# Patient Record
Sex: FEMALE | Race: BLACK OR AFRICAN AMERICAN | NOT HISPANIC OR LATINO | Employment: FULL TIME | URBAN - METROPOLITAN AREA
[De-identification: names, ages, dates, MRNs, and addresses within clinical notes are randomized per-mention and may not be internally consistent; named-entity substitution may affect disease eponyms.]

---

## 2018-04-07 ENCOUNTER — HOSPITAL ENCOUNTER (EMERGENCY)
Facility: HOSPITAL | Age: 28
Discharge: PSYCHIATRIC OR SUBSTANCE USE FACILITY | End: 2018-04-07
Attending: EMERGENCY MEDICINE
Payer: MEDICAID

## 2018-04-07 VITALS
HEIGHT: 64 IN | OXYGEN SATURATION: 100 % | HEART RATE: 97 BPM | DIASTOLIC BLOOD PRESSURE: 75 MMHG | SYSTOLIC BLOOD PRESSURE: 145 MMHG | RESPIRATION RATE: 18 BRPM | BODY MASS INDEX: 41.48 KG/M2 | WEIGHT: 243 LBS | TEMPERATURE: 99 F

## 2018-04-07 DIAGNOSIS — D64.9 ANEMIA, UNSPECIFIED TYPE: ICD-10-CM

## 2018-04-07 DIAGNOSIS — R45.851 SUICIDAL IDEATION: Primary | ICD-10-CM

## 2018-04-07 LAB
ALBUMIN SERPL BCP-MCNC: 3.8 G/DL
ALP SERPL-CCNC: 111 U/L
ALT SERPL W/O P-5'-P-CCNC: 15 U/L
AMPHET+METHAMPHET UR QL: NEGATIVE
ANION GAP SERPL CALC-SCNC: 11 MMOL/L
ANISOCYTOSIS BLD QL SMEAR: ABNORMAL
APAP SERPL-MCNC: <3 UG/ML
AST SERPL-CCNC: 15 U/L
BACTERIA #/AREA URNS HPF: ABNORMAL /HPF
BARBITURATES UR QL SCN>200 NG/ML: NEGATIVE
BASOPHILS # BLD AUTO: 0.02 K/UL
BASOPHILS NFR BLD: 0.3 %
BENZODIAZ UR QL SCN>200 NG/ML: NEGATIVE
BILIRUB SERPL-MCNC: 0.7 MG/DL
BILIRUB UR QL STRIP: NEGATIVE
BUN SERPL-MCNC: 9 MG/DL
BZE UR QL SCN: NEGATIVE
CALCIUM SERPL-MCNC: 9.2 MG/DL
CANNABINOIDS UR QL SCN: NEGATIVE
CHLORIDE SERPL-SCNC: 105 MMOL/L
CLARITY UR: CLEAR
CO2 SERPL-SCNC: 18 MMOL/L
COLOR UR: YELLOW
CREAT SERPL-MCNC: 0.8 MG/DL
CREAT UR-MCNC: 76.4 MG/DL
DACRYOCYTES BLD QL SMEAR: ABNORMAL
DIFFERENTIAL METHOD: ABNORMAL
EOSINOPHIL # BLD AUTO: 0 K/UL
EOSINOPHIL NFR BLD: 0.3 %
ERYTHROCYTE [DISTWIDTH] IN BLOOD BY AUTOMATED COUNT: 19.8 %
EST. GFR  (AFRICAN AMERICAN): >60 ML/MIN/1.73 M^2
EST. GFR  (NON AFRICAN AMERICAN): >60 ML/MIN/1.73 M^2
ETHANOL SERPL-MCNC: <10 MG/DL
GLUCOSE SERPL-MCNC: 304 MG/DL
GLUCOSE UR QL STRIP: ABNORMAL
HCT VFR BLD AUTO: 30.6 %
HGB BLD-MCNC: 8.6 G/DL
HGB UR QL STRIP: ABNORMAL
HYALINE CASTS #/AREA URNS LPF: 0 /LPF
HYPOCHROMIA BLD QL SMEAR: ABNORMAL
KETONES UR QL STRIP: ABNORMAL
LEUKOCYTE ESTERASE UR QL STRIP: NEGATIVE
LYMPHOCYTES # BLD AUTO: 1.2 K/UL
LYMPHOCYTES NFR BLD: 17.3 %
MCH RBC QN AUTO: 18.7 PG
MCHC RBC AUTO-ENTMCNC: 28.1 G/DL
MCV RBC AUTO: 66 FL
METHADONE UR QL SCN>300 NG/ML: NEGATIVE
MICROSCOPIC COMMENT: ABNORMAL
MONOCYTES # BLD AUTO: 0.4 K/UL
MONOCYTES NFR BLD: 5.1 %
NEUTROPHILS # BLD AUTO: 5.3 K/UL
NEUTROPHILS NFR BLD: 77.3 %
NITRITE UR QL STRIP: NEGATIVE
OPIATES UR QL SCN: NEGATIVE
PCP UR QL SCN>25 NG/ML: NEGATIVE
PH UR STRIP: 6 [PH] (ref 5–8)
PLATELET # BLD AUTO: 614 K/UL
PLATELET BLD QL SMEAR: ABNORMAL
PMV BLD AUTO: 8.9 FL
POCT GLUCOSE: 231 MG/DL (ref 70–110)
POCT GLUCOSE: 357 MG/DL (ref 70–110)
POCT GLUCOSE: 367 MG/DL (ref 70–110)
POIKILOCYTOSIS BLD QL SMEAR: SLIGHT
POLYCHROMASIA BLD QL SMEAR: ABNORMAL
POTASSIUM SERPL-SCNC: 4.4 MMOL/L
PROT SERPL-MCNC: 8.1 G/DL
PROT UR QL STRIP: ABNORMAL
RBC # BLD AUTO: 4.61 M/UL
RBC #/AREA URNS HPF: 1 /HPF (ref 0–4)
SALICYLATES SERPL-MCNC: <5 MG/DL
SODIUM SERPL-SCNC: 134 MMOL/L
SP GR UR STRIP: 1.02 (ref 1–1.03)
SQUAMOUS #/AREA URNS HPF: 20 /HPF
STOMATOCYTES BLD QL SMEAR: PRESENT
TOXICOLOGY INFORMATION: NORMAL
TSH SERPL DL<=0.005 MIU/L-ACNC: 0.45 UIU/ML
URN SPEC COLLECT METH UR: ABNORMAL
UROBILINOGEN UR STRIP-ACNC: NEGATIVE EU/DL
WBC # BLD AUTO: 6.83 K/UL
WBC #/AREA URNS HPF: 1 /HPF (ref 0–5)
YEAST URNS QL MICRO: ABNORMAL

## 2018-04-07 PROCEDURE — 80307 DRUG TEST PRSMV CHEM ANLYZR: CPT

## 2018-04-07 PROCEDURE — 25000003 PHARM REV CODE 250: Performed by: EMERGENCY MEDICINE

## 2018-04-07 PROCEDURE — 96374 THER/PROPH/DIAG INJ IV PUSH: CPT

## 2018-04-07 PROCEDURE — 99282 EMERGENCY DEPT VISIT SF MDM: CPT | Mod: GT,AF,HB, | Performed by: PSYCHIATRY & NEUROLOGY

## 2018-04-07 PROCEDURE — 80053 COMPREHEN METABOLIC PANEL: CPT

## 2018-04-07 PROCEDURE — 80320 DRUG SCREEN QUANTALCOHOLS: CPT

## 2018-04-07 PROCEDURE — 80329 ANALGESICS NON-OPIOID 1 OR 2: CPT

## 2018-04-07 PROCEDURE — 63600175 PHARM REV CODE 636 W HCPCS: Performed by: EMERGENCY MEDICINE

## 2018-04-07 PROCEDURE — 85025 COMPLETE CBC W/AUTO DIFF WBC: CPT

## 2018-04-07 PROCEDURE — 96372 THER/PROPH/DIAG INJ SC/IM: CPT | Mod: 59

## 2018-04-07 PROCEDURE — 82962 GLUCOSE BLOOD TEST: CPT

## 2018-04-07 PROCEDURE — 84443 ASSAY THYROID STIM HORMONE: CPT

## 2018-04-07 PROCEDURE — 99285 EMERGENCY DEPT VISIT HI MDM: CPT | Mod: 25

## 2018-04-07 PROCEDURE — 81000 URINALYSIS NONAUTO W/SCOPE: CPT | Mod: 59

## 2018-04-07 PROCEDURE — 96361 HYDRATE IV INFUSION ADD-ON: CPT

## 2018-04-07 RX ORDER — VENLAFAXINE HYDROCHLORIDE 150 MG/1
75 CAPSULE, EXTENDED RELEASE ORAL DAILY
COMMUNITY

## 2018-04-07 RX ORDER — ACETAMINOPHEN 325 MG/1
650 TABLET ORAL
Status: COMPLETED | OUTPATIENT
Start: 2018-04-07 | End: 2018-04-07

## 2018-04-07 RX ORDER — FERROUS SULFATE 324(65)MG
325 TABLET, DELAYED RELEASE (ENTERIC COATED) ORAL DAILY
COMMUNITY

## 2018-04-07 RX ORDER — METFORMIN HYDROCHLORIDE 1000 MG/1
1000 TABLET ORAL 2 TIMES DAILY WITH MEALS
COMMUNITY
End: 2018-07-12

## 2018-04-07 RX ORDER — ALPRAZOLAM 1 MG/1
1 TABLET ORAL 2 TIMES DAILY
COMMUNITY

## 2018-04-07 RX ORDER — ERGOCALCIFEROL 1.25 MG/1
50000 CAPSULE ORAL
COMMUNITY

## 2018-04-07 RX ADMIN — ACETAMINOPHEN 650 MG: 325 TABLET ORAL at 12:04

## 2018-04-07 RX ADMIN — SODIUM CHLORIDE 1000 ML: 0.9 INJECTION, SOLUTION INTRAVENOUS at 06:04

## 2018-04-07 RX ADMIN — INSULIN HUMAN 10 UNITS: 100 INJECTION, SOLUTION PARENTERAL at 06:04

## 2018-04-07 RX ADMIN — INSULIN HUMAN 10 UNITS: 100 INJECTION, SOLUTION PARENTERAL at 05:04

## 2018-04-07 NOTE — ED NOTES
Pending Glucose Control. Accepting facility has notified RN.    Pt has been accepted to [facility] via [facility]. Accepting MD is Dr. Lance. Call report to [phone].

## 2018-04-07 NOTE — ED NOTES
Faxed packet to [facility], [facility], [facility], [facility], [facility], [facility], [facility], [facility], [facility], [facility], [facility], [facility], [facility], [facility], [facility], [facility],.
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
## 2018-07-12 ENCOUNTER — HOSPITAL ENCOUNTER (INPATIENT)
Facility: HOSPITAL | Age: 28
LOS: 5 days | Discharge: HOME | DRG: 872 | End: 2018-07-17
Attending: EMERGENCY MEDICINE | Admitting: INTERNAL MEDICINE
Payer: MEDICAID

## 2018-07-12 DIAGNOSIS — R00.0 TACHYCARDIA: ICD-10-CM

## 2018-07-12 DIAGNOSIS — N12 PYELONEPHRITIS: ICD-10-CM

## 2018-07-12 DIAGNOSIS — N39.0 URINARY TRACT INFECTION WITHOUT HEMATURIA, SITE UNSPECIFIED: ICD-10-CM

## 2018-07-12 DIAGNOSIS — D64.9 ANEMIA, UNSPECIFIED TYPE: ICD-10-CM

## 2018-07-12 DIAGNOSIS — R11.2 NON-INTRACTABLE VOMITING WITH NAUSEA, UNSPECIFIED VOMITING TYPE: Primary | ICD-10-CM

## 2018-07-12 PROBLEM — E11.65 TYPE 2 DIABETES MELLITUS WITH HYPERGLYCEMIA, WITHOUT LONG-TERM CURRENT USE OF INSULIN: Status: ACTIVE | Noted: 2018-07-12

## 2018-07-12 PROBLEM — A41.9 SEVERE SEPSIS: Status: ACTIVE | Noted: 2018-07-12

## 2018-07-12 PROBLEM — F32.9 MAJOR DEPRESSIVE DISORDER: Status: ACTIVE | Noted: 2018-07-12

## 2018-07-12 PROBLEM — R65.20 SEVERE SEPSIS: Status: ACTIVE | Noted: 2018-07-12

## 2018-07-12 PROBLEM — D62 ACUTE BLOOD LOSS ANEMIA: Status: ACTIVE | Noted: 2018-07-12

## 2018-07-12 LAB
ABO + RH BLD: NORMAL
ALBUMIN SERPL BCP-MCNC: 3 G/DL
ALP SERPL-CCNC: 98 U/L
ALT SERPL W/O P-5'-P-CCNC: 18 U/L
ANION GAP SERPL CALC-SCNC: 12 MMOL/L
ANISOCYTOSIS BLD QL SMEAR: SLIGHT
AST SERPL-CCNC: 30 U/L
B-HCG UR QL: NEGATIVE
B-OH-BUTYR BLD STRIP-SCNC: 0.8 MMOL/L
BACTERIA #/AREA URNS HPF: ABNORMAL /HPF
BASOPHILS # BLD AUTO: 0.01 K/UL
BASOPHILS NFR BLD: 0.1 %
BILIRUB SERPL-MCNC: 1.4 MG/DL
BILIRUB UR QL STRIP: NEGATIVE
BLD GP AB SCN CELLS X3 SERPL QL: NORMAL
BLD PROD TYP BPU: NORMAL
BLD PROD TYP BPU: NORMAL
BLOOD UNIT EXPIRATION DATE: NORMAL
BLOOD UNIT EXPIRATION DATE: NORMAL
BLOOD UNIT TYPE CODE: 6200
BLOOD UNIT TYPE CODE: 6200
BLOOD UNIT TYPE: NORMAL
BLOOD UNIT TYPE: NORMAL
BUN SERPL-MCNC: 10 MG/DL
CALCIUM SERPL-MCNC: 9 MG/DL
CHLORIDE SERPL-SCNC: 104 MMOL/L
CLARITY UR: CLEAR
CO2 SERPL-SCNC: 19 MMOL/L
CODING SYSTEM: NORMAL
CODING SYSTEM: NORMAL
COLOR UR: YELLOW
CREAT SERPL-MCNC: 0.9 MG/DL
DACRYOCYTES BLD QL SMEAR: ABNORMAL
DIFFERENTIAL METHOD: ABNORMAL
DISPENSE STATUS: NORMAL
DISPENSE STATUS: NORMAL
EOSINOPHIL # BLD AUTO: 0 K/UL
EOSINOPHIL NFR BLD: 0.2 %
ERYTHROCYTE [DISTWIDTH] IN BLOOD BY AUTOMATED COUNT: 18.4 %
EST. GFR  (AFRICAN AMERICAN): >60 ML/MIN/1.73 M^2
EST. GFR  (NON AFRICAN AMERICAN): >60 ML/MIN/1.73 M^2
GLUCOSE SERPL-MCNC: 273 MG/DL
GLUCOSE SERPL-MCNC: 279 MG/DL (ref 70–110)
GLUCOSE SERPL-MCNC: 287 MG/DL (ref 70–110)
GLUCOSE SERPL-MCNC: 348 MG/DL (ref 70–110)
GLUCOSE UR QL STRIP: ABNORMAL
HCT VFR BLD AUTO: 20.7 %
HGB BLD-MCNC: 6.1 G/DL
HGB UR QL STRIP: ABNORMAL
HYALINE CASTS #/AREA URNS LPF: 0 /LPF
HYPOCHROMIA BLD QL SMEAR: ABNORMAL
KETONES UR QL STRIP: ABNORMAL
LACTATE SERPL-SCNC: 1.2 MMOL/L
LEUKOCYTE ESTERASE UR QL STRIP: ABNORMAL
LIPASE SERPL-CCNC: 8 U/L
LYMPHOCYTES # BLD AUTO: 0.5 K/UL
LYMPHOCYTES NFR BLD: 5.1 %
MAGNESIUM SERPL-MCNC: 1.2 MG/DL
MCH RBC QN AUTO: 18 PG
MCHC RBC AUTO-ENTMCNC: 29.5 G/DL
MCV RBC AUTO: 61 FL
MICROSCOPIC COMMENT: ABNORMAL
MONOCYTES # BLD AUTO: 0.7 K/UL
MONOCYTES NFR BLD: 8.2 %
NEUTROPHILS # BLD AUTO: 7.8 K/UL
NEUTROPHILS NFR BLD: 86.8 %
NITRITE UR QL STRIP: POSITIVE
NUM UNITS TRANS PACKED RBC: NORMAL
NUM UNITS TRANS PACKED RBC: NORMAL
OVALOCYTES BLD QL SMEAR: ABNORMAL
PH UR STRIP: 6 [PH] (ref 5–8)
PLATELET # BLD AUTO: 268 K/UL
PLATELET BLD QL SMEAR: ABNORMAL
PMV BLD AUTO: 8.7 FL
POCT GLUCOSE: 279 MG/DL (ref 70–110)
POCT GLUCOSE: 287 MG/DL (ref 70–110)
POCT GLUCOSE: 346 MG/DL (ref 70–110)
POCT GLUCOSE: 348 MG/DL (ref 70–110)
POIKILOCYTOSIS BLD QL SMEAR: SLIGHT
POLYCHROMASIA BLD QL SMEAR: ABNORMAL
POTASSIUM SERPL-SCNC: 3.4 MMOL/L
PROCALCITONIN SERPL IA-MCNC: 22.62 NG/ML
PROT SERPL-MCNC: 7.2 G/DL
PROT UR QL STRIP: ABNORMAL
RBC # BLD AUTO: 3.38 M/UL
RBC #/AREA URNS HPF: 7 /HPF (ref 0–4)
SODIUM SERPL-SCNC: 135 MMOL/L
SP GR UR STRIP: 1.02 (ref 1–1.03)
SPHEROCYTES BLD QL SMEAR: ABNORMAL
SQUAMOUS #/AREA URNS HPF: 4 /HPF
STOMATOCYTES BLD QL SMEAR: PRESENT
TARGETS BLD QL SMEAR: ABNORMAL
URN SPEC COLLECT METH UR: ABNORMAL
UROBILINOGEN UR STRIP-ACNC: NEGATIVE EU/DL
WBC # BLD AUTO: 9.02 K/UL
WBC #/AREA URNS HPF: >100 /HPF (ref 0–5)
WBC CLUMPS URNS QL MICRO: ABNORMAL

## 2018-07-12 PROCEDURE — 84145 PROCALCITONIN (PCT): CPT

## 2018-07-12 PROCEDURE — 86850 RBC ANTIBODY SCREEN: CPT

## 2018-07-12 PROCEDURE — 87086 URINE CULTURE/COLONY COUNT: CPT

## 2018-07-12 PROCEDURE — 36430 TRANSFUSION BLD/BLD COMPNT: CPT

## 2018-07-12 PROCEDURE — 87186 SC STD MICRODIL/AGAR DIL: CPT

## 2018-07-12 PROCEDURE — 85025 COMPLETE CBC W/AUTO DIFF WBC: CPT

## 2018-07-12 PROCEDURE — 36415 COLL VENOUS BLD VENIPUNCTURE: CPT

## 2018-07-12 PROCEDURE — 82010 KETONE BODYS QUAN: CPT

## 2018-07-12 PROCEDURE — 63600175 PHARM REV CODE 636 W HCPCS: Performed by: NURSE PRACTITIONER

## 2018-07-12 PROCEDURE — 11000001 HC ACUTE MED/SURG PRIVATE ROOM

## 2018-07-12 PROCEDURE — 81025 URINE PREGNANCY TEST: CPT

## 2018-07-12 PROCEDURE — 93010 ELECTROCARDIOGRAM REPORT: CPT | Mod: ,,, | Performed by: INTERNAL MEDICINE

## 2018-07-12 PROCEDURE — 93005 ELECTROCARDIOGRAM TRACING: CPT

## 2018-07-12 PROCEDURE — 80053 COMPREHEN METABOLIC PANEL: CPT

## 2018-07-12 PROCEDURE — 83605 ASSAY OF LACTIC ACID: CPT

## 2018-07-12 PROCEDURE — 96365 THER/PROPH/DIAG IV INF INIT: CPT

## 2018-07-12 PROCEDURE — 63600175 PHARM REV CODE 636 W HCPCS: Performed by: INTERNAL MEDICINE

## 2018-07-12 PROCEDURE — 96366 THER/PROPH/DIAG IV INF ADDON: CPT

## 2018-07-12 PROCEDURE — 93010 ELECTROCARDIOGRAM REPORT: CPT | Mod: 76,,, | Performed by: INTERNAL MEDICINE

## 2018-07-12 PROCEDURE — 99285 EMERGENCY DEPT VISIT HI MDM: CPT

## 2018-07-12 PROCEDURE — 25000003 PHARM REV CODE 250: Performed by: NURSE PRACTITIONER

## 2018-07-12 PROCEDURE — 87040 BLOOD CULTURE FOR BACTERIA: CPT | Mod: 59

## 2018-07-12 PROCEDURE — 82962 GLUCOSE BLOOD TEST: CPT

## 2018-07-12 PROCEDURE — 87077 CULTURE AEROBIC IDENTIFY: CPT | Mod: 59

## 2018-07-12 PROCEDURE — 25000003 PHARM REV CODE 250: Performed by: INTERNAL MEDICINE

## 2018-07-12 PROCEDURE — 83735 ASSAY OF MAGNESIUM: CPT

## 2018-07-12 PROCEDURE — 96372 THER/PROPH/DIAG INJ SC/IM: CPT

## 2018-07-12 PROCEDURE — 81000 URINALYSIS NONAUTO W/SCOPE: CPT

## 2018-07-12 PROCEDURE — P9016 RBC LEUKOCYTES REDUCED: HCPCS

## 2018-07-12 PROCEDURE — 87088 URINE BACTERIA CULTURE: CPT

## 2018-07-12 PROCEDURE — 96375 TX/PRO/DX INJ NEW DRUG ADDON: CPT

## 2018-07-12 PROCEDURE — 96361 HYDRATE IV INFUSION ADD-ON: CPT

## 2018-07-12 PROCEDURE — 96367 TX/PROPH/DG ADDL SEQ IV INF: CPT

## 2018-07-12 PROCEDURE — 25000003 PHARM REV CODE 250: Performed by: EMERGENCY MEDICINE

## 2018-07-12 PROCEDURE — 83690 ASSAY OF LIPASE: CPT

## 2018-07-12 PROCEDURE — 63600175 PHARM REV CODE 636 W HCPCS: Performed by: EMERGENCY MEDICINE

## 2018-07-12 PROCEDURE — 86920 COMPATIBILITY TEST SPIN: CPT

## 2018-07-12 RX ORDER — ALPRAZOLAM 0.5 MG/1
0.5 TABLET ORAL 3 TIMES DAILY PRN
Status: DISCONTINUED | OUTPATIENT
Start: 2018-07-12 | End: 2018-07-13

## 2018-07-12 RX ORDER — ALPRAZOLAM 0.5 MG/1
0.5 TABLET ORAL 3 TIMES DAILY
Status: DISCONTINUED | OUTPATIENT
Start: 2018-07-12 | End: 2018-07-12

## 2018-07-12 RX ORDER — ONDANSETRON 8 MG/1
8 TABLET, ORALLY DISINTEGRATING ORAL EVERY 8 HOURS PRN
Status: DISCONTINUED | OUTPATIENT
Start: 2018-07-12 | End: 2018-07-14

## 2018-07-12 RX ORDER — LANOLIN ALCOHOL/MO/W.PET/CERES
400 CREAM (GRAM) TOPICAL 2 TIMES DAILY
Status: COMPLETED | OUTPATIENT
Start: 2018-07-12 | End: 2018-07-15

## 2018-07-12 RX ORDER — ACETAMINOPHEN 325 MG/1
650 TABLET ORAL EVERY 4 HOURS PRN
Status: DISCONTINUED | OUTPATIENT
Start: 2018-07-12 | End: 2018-07-17 | Stop reason: HOSPADM

## 2018-07-12 RX ORDER — VENLAFAXINE HYDROCHLORIDE 75 MG/1
75 CAPSULE, EXTENDED RELEASE ORAL DAILY
Status: DISCONTINUED | OUTPATIENT
Start: 2018-07-12 | End: 2018-07-17 | Stop reason: HOSPADM

## 2018-07-12 RX ORDER — SODIUM CHLORIDE 0.9 % (FLUSH) 0.9 %
5 SYRINGE (ML) INJECTION
Status: DISCONTINUED | OUTPATIENT
Start: 2018-07-12 | End: 2018-07-17 | Stop reason: HOSPADM

## 2018-07-12 RX ORDER — SODIUM CHLORIDE, SODIUM LACTATE, POTASSIUM CHLORIDE, CALCIUM CHLORIDE 600; 310; 30; 20 MG/100ML; MG/100ML; MG/100ML; MG/100ML
INJECTION, SOLUTION INTRAVENOUS CONTINUOUS
Status: DISCONTINUED | OUTPATIENT
Start: 2018-07-12 | End: 2018-07-14

## 2018-07-12 RX ORDER — VENLAFAXINE HYDROCHLORIDE 75 MG/1
75 CAPSULE, EXTENDED RELEASE ORAL DAILY
Status: DISCONTINUED | OUTPATIENT
Start: 2018-07-13 | End: 2018-07-12

## 2018-07-12 RX ORDER — OXYCODONE HYDROCHLORIDE 5 MG/1
5 TABLET ORAL EVERY 6 HOURS PRN
Status: DISCONTINUED | OUTPATIENT
Start: 2018-07-12 | End: 2018-07-14

## 2018-07-12 RX ORDER — ALPRAZOLAM 0.5 MG/1
0.5 TABLET ORAL ONCE
Status: COMPLETED | OUTPATIENT
Start: 2018-07-12 | End: 2018-07-12

## 2018-07-12 RX ORDER — IBUPROFEN 200 MG
16 TABLET ORAL
Status: DISCONTINUED | OUTPATIENT
Start: 2018-07-12 | End: 2018-07-17 | Stop reason: HOSPADM

## 2018-07-12 RX ORDER — POTASSIUM CHLORIDE 750 MG/1
10 TABLET, EXTENDED RELEASE ORAL 3 TIMES DAILY
Status: DISCONTINUED | OUTPATIENT
Start: 2018-07-12 | End: 2018-07-13

## 2018-07-12 RX ORDER — DIPHENHYDRAMINE HCL 25 MG
25 CAPSULE ORAL
Status: COMPLETED | OUTPATIENT
Start: 2018-07-12 | End: 2018-07-12

## 2018-07-12 RX ORDER — GLUCAGON 1 MG
1 KIT INJECTION
Status: DISCONTINUED | OUTPATIENT
Start: 2018-07-12 | End: 2018-07-17 | Stop reason: HOSPADM

## 2018-07-12 RX ORDER — ONDANSETRON 2 MG/ML
4 INJECTION INTRAMUSCULAR; INTRAVENOUS
Status: COMPLETED | OUTPATIENT
Start: 2018-07-12 | End: 2018-07-12

## 2018-07-12 RX ORDER — INSULIN ASPART 100 [IU]/ML
0-5 INJECTION, SOLUTION INTRAVENOUS; SUBCUTANEOUS
Status: DISCONTINUED | OUTPATIENT
Start: 2018-07-12 | End: 2018-07-16

## 2018-07-12 RX ORDER — ACETAMINOPHEN 10 MG/ML
500 INJECTION, SOLUTION INTRAVENOUS ONCE
Status: COMPLETED | OUTPATIENT
Start: 2018-07-12 | End: 2018-07-12

## 2018-07-12 RX ORDER — KETOROLAC TROMETHAMINE 30 MG/ML
15 INJECTION, SOLUTION INTRAMUSCULAR; INTRAVENOUS ONCE
Status: COMPLETED | OUTPATIENT
Start: 2018-07-12 | End: 2018-07-12

## 2018-07-12 RX ORDER — IBUPROFEN 200 MG
24 TABLET ORAL
Status: DISCONTINUED | OUTPATIENT
Start: 2018-07-12 | End: 2018-07-17 | Stop reason: HOSPADM

## 2018-07-12 RX ORDER — ACETAMINOPHEN 325 MG/1
650 TABLET ORAL EVERY 6 HOURS PRN
Status: DISCONTINUED | OUTPATIENT
Start: 2018-07-12 | End: 2018-07-12

## 2018-07-12 RX ORDER — HYDROCODONE BITARTRATE AND ACETAMINOPHEN 500; 5 MG/1; MG/1
TABLET ORAL
Status: DISCONTINUED | OUTPATIENT
Start: 2018-07-12 | End: 2018-07-17 | Stop reason: HOSPADM

## 2018-07-12 RX ORDER — VENLAFAXINE HYDROCHLORIDE 75 MG/1
75 CAPSULE, EXTENDED RELEASE ORAL DAILY
Status: DISCONTINUED | OUTPATIENT
Start: 2018-07-12 | End: 2018-07-12

## 2018-07-12 RX ADMIN — ACETAMINOPHEN 650 MG: 325 TABLET, FILM COATED ORAL at 05:07

## 2018-07-12 RX ADMIN — KETOROLAC TROMETHAMINE 15 MG: 30 INJECTION, SOLUTION INTRAMUSCULAR at 08:07

## 2018-07-12 RX ADMIN — OXYCODONE HYDROCHLORIDE 5 MG: 5 TABLET ORAL at 11:07

## 2018-07-12 RX ADMIN — INSULIN ASPART 2 UNITS: 100 INJECTION, SOLUTION INTRAVENOUS; SUBCUTANEOUS at 09:07

## 2018-07-12 RX ADMIN — ACETAMINOPHEN 500 MG: 10 INJECTION, SOLUTION INTRAVENOUS at 10:07

## 2018-07-12 RX ADMIN — SODIUM CHLORIDE, SODIUM LACTATE, POTASSIUM CHLORIDE, AND CALCIUM CHLORIDE: .6; .31; .03; .02 INJECTION, SOLUTION INTRAVENOUS at 11:07

## 2018-07-12 RX ADMIN — CEFTRIAXONE 1 G: 1 INJECTION, SOLUTION INTRAVENOUS at 02:07

## 2018-07-12 RX ADMIN — SODIUM CHLORIDE 1000 ML: 0.9 INJECTION, SOLUTION INTRAVENOUS at 01:07

## 2018-07-12 RX ADMIN — POTASSIUM CHLORIDE 10 MEQ: 750 TABLET, FILM COATED, EXTENDED RELEASE ORAL at 09:07

## 2018-07-12 RX ADMIN — MAGNESIUM OXIDE TAB 400 MG (241.3 MG ELEMENTAL MG) 400 MG: 400 (241.3 MG) TAB at 09:07

## 2018-07-12 RX ADMIN — SODIUM CHLORIDE, SODIUM LACTATE, POTASSIUM CHLORIDE, AND CALCIUM CHLORIDE 1000 ML: .6; .31; .03; .02 INJECTION, SOLUTION INTRAVENOUS at 06:07

## 2018-07-12 RX ADMIN — SODIUM CHLORIDE 1000 ML: 0.9 INJECTION, SOLUTION INTRAVENOUS at 03:07

## 2018-07-12 RX ADMIN — INSULIN DETEMIR 20 UNITS: 100 INJECTION, SOLUTION SUBCUTANEOUS at 09:07

## 2018-07-12 RX ADMIN — ACETAMINOPHEN 500 MG: 10 INJECTION, SOLUTION INTRAVENOUS at 08:07

## 2018-07-12 RX ADMIN — ONDANSETRON 4 MG: 2 INJECTION, SOLUTION INTRAMUSCULAR; INTRAVENOUS at 01:07

## 2018-07-12 RX ADMIN — PIPERACILLIN AND TAZOBACTAM 4.5 G: 4; .5 INJECTION, POWDER, LYOPHILIZED, FOR SOLUTION INTRAVENOUS; PARENTERAL at 11:07

## 2018-07-12 RX ADMIN — ALPRAZOLAM 0.5 MG: 0.5 TABLET ORAL at 06:07

## 2018-07-12 RX ADMIN — DIPHENHYDRAMINE HYDROCHLORIDE 25 MG: 25 CAPSULE ORAL at 07:07

## 2018-07-12 RX ADMIN — INSULIN HUMAN 6 UNITS: 100 INJECTION, SOLUTION PARENTERAL at 04:07

## 2018-07-12 NOTE — ED NOTES
"Pt c/o mid back pain rated 10/10 on pain scale. Describes as "hard pressure"    Notified WINSTON Purvis NP  "

## 2018-07-12 NOTE — HPI
Started feeling bad this Tuesday.  The first symptom she noted was her hands felt warm early in the day.  Later on she had fevers and chills and mid back pain.  Associated with weakness, dizziness, and shortness of breath walking around the house.  Later she developed nausea and vomiting.  She vomited a clear yellow liquid that tasted bitter.  Yesterday she went to an after-hours clinic and was given Tylenol and Ibuprofen for symptoms and sent home.  She continued to feel worse so came to the ER today.  Denies any recent blood loss and her menstrual cycles have been regular with normal volume. No other recent illness or known sick contacts.

## 2018-07-12 NOTE — ED NOTES
Pts HR noted in 140s on monitor. Pts Temp 100.5. Pt reports SOB but denies any chest pain. HOB raised. Paged [facility] medicine

## 2018-07-12 NOTE — ED PROVIDER NOTES
SCRIBE #1 NOTE: I, Corinne Mack, am scribing for, and in the presence of, Lito Bowman DO. I have scribed the entire note.      History      Chief Complaint   Patient presents with    Emesis     vomiting and fever       Review of patient's allergies indicates:  No Known Allergies     HPI   HPI    2018, 12:27 PM   History obtained from the patient      History of Present Illness: Ladi Gabriel is a 28 y.o. female patient with PMHx of DM who presents to the Emergency Department for fever (Tmax 102.8) which onset gradually 2 days ago. Pt went to [facility] yesterday and was Dx with a viral syndrome. Symptoms are intermittent and moderate in severity. No mitigating or exacerbating factors reported. Associated sxs include urinary frequency, N/V, and chills. Pt states she may have a UTI. Patient denies any diarrhea, abd pain, back pain, neck pain, dysuria, hematuria, HA, and all other sxs at this time. No prior Tx reported. No further complaints or concerns at this time.         Arrival mode: AASI    PCP: Provider Not[facility]ystem       Past Medical History:  Past Medical History:   Diagnosis Date    Depression     Diabetes     Uterine fibroids in pregnancy, postpartum condition     Vitamin D deficiency        Past Surgical History:  Past Surgical History:   Procedure Laterality Date     SECTION           Family History:  History reviewed. No pertinent family history.    Social History:  Social History     Social History Main Topics    Smoking status: Never Smoker    Smokeless tobacco: Never Used    Alcohol use 3.6 oz/week     6 Glasses of wine per week    Drug use: No    Sexual activity: Not on file       ROS   Review of Systems   Constitutional: Positive for chills and fever (Tmax 102.8).   Respiratory: Negative for cough and shortness of breath.    Cardiovascular: Negative for chest pain and leg swelling.   Gastrointestinal: Positive for nausea and vomiting. Negative for abdominal pain and  diarrhea.   Genitourinary: Positive for frequency. Negative for dysuria, flank pain and hematuria.   Musculoskeletal: Negative for back pain, neck pain and neck stiffness.   Skin: Negative for rash and wound.   Neurological: Negative for dizziness, light-headedness, numbness and headaches.   All other systems reviewed and are negative.    Physical Exam      Initial Vitals [07/12/18 1221]   BP Pulse Resp Temp SpO2   126/64 (!) 130 20 (!) 102.8 °F (39.3 °C) 99 %      MAP       --          Physical Exam  Nursing Notes and Vital Signs Reviewed.  Constitutional: Patient is in no apparent distress. Well-developed and well-nourished.  Head: Atraumatic. Normocephalic.  Eyes: PERRL. EOM intact. Conjunctivae are not pale. No scleral icterus.  ENT: Mucous membranes are moist. Oropharynx is clear and symmetric.    Neck: Supple. Full ROM. No lymphadenopathy.  Cardiovascular: Tachycardic. Regular rhythm. No murmurs, rubs, or gallops. Distal pulses are 2+ and symmetric.  Pulmonary/Chest: No respiratory distress. Clear to auscultation bilaterally. No wheezing or rales.  Abdominal: Soft and non-distended.  There is no tenderness.  No rebound, guarding, or rigidity.  Musculoskeletal: Moves all extremities. No obvious deformities.  Skin: Warm and dry.  Neurological:  Alert, awake, and appropriate.  Normal speech.  No acute focal neurological deficits are appreciated.  Psychiatric: Normal affect. Good eye contact. Appropriate in content.    ED Course    Procedures  ED Vital Signs:  Vitals:    07/12/18 1221 07/12/18 1306 07/12/18 1307 07/12/18 1335   BP: 126/64  (!) 111/56    Pulse: (!) 130  (!) 121 (!) 120   Resp: 20  20    Temp: (!) 102.8 °F (39.3 °C) (!) 100.6 °F (38.1 °C)     TempSrc: Oral Oral     SpO2: 99%  100%    Weight:  114.9 kg (253 lb 6.4 oz)      07/12/18 1406 07/12/18 1432 07/12/18 1546   BP: 111/61  (!) 104/56   Pulse: (!) 126 110 109   Resp: 14 (!) 22 20   Temp:  99.6 °F (37.6 °C) 99.4 °F (37.4 °C)   TempSrc:  Oral Oral    SpO2: 99% 100% 98%   Weight:          Abnormal Lab Results:  Labs Reviewed   CBC W/ AUTO DIFFERENTIAL - Abnormal; Notable for the following:        Result Value    RBC 3.38 (*)     Hemoglobin 6.1 (*)     Hematocrit 20.7 (*)     MCV 61 (*)     MCH 18.0 (*)     MCHC 29.5 (*)     RDW 18.4 (*)     MPV 8.7 (*)     Gran # (ANC) 7.8 (*)     Lymph # 0.5 (*)     Gran% 86.8 (*)     Lymph% 5.1 (*)     All other components within normal limits   COMPREHENSIVE METABOLIC PANEL - Abnormal; Notable for the following:     Sodium 135 (*)     Potassium 3.4 (*)     CO2 19 (*)     Glucose 273 (*)     Albumin 3.0 (*)     Total Bilirubin 1.4 (*)     All other components within normal limits   URINALYSIS - Abnormal; Notable for the following:     Protein, UA 2+ (*)     Glucose, UA 2+ (*)     Ketones, UA 3+ (*)     Occult Blood UA 2+ (*)     Nitrite, UA Positive (*)     Leukocytes, UA 1+ (*)     All other components within normal limits   BETA - HYDROXYBUTYRATE, SERUM - Abnormal; Notable for the following:     Beta-Hydroxybutyrate 0.8 (*)     All other components within normal limits   URINALYSIS MICROSCOPIC - Abnormal; Notable for the following:     RBC, UA 7 (*)     WBC, UA >100 (*)     Bacteria, UA Moderate (*)     All other components within normal limits   POCT GLUCOSE MONITORING CONTINUOUS - Abnormal; Notable for the following:     POC Glucose 287 (*)     All other components within normal limits   POCT GLUCOSE - Abnormal; Notable for the following:     POCT Glucose 287 (*)     All other components within normal limits   POCT GLUCOSE - Abnormal; Notable for the following:     POCT Glucose 279 (*)     All other components within normal limits   CULTURE, BLOOD   CULTURE, BLOOD   LIPASE   PREGNANCY TEST, URINE RAPID   LACTIC ACID, PLASMA   LACTIC ACID, PLASMA   TYPE & SCREEN   POCT GLUCOSE MONITORING CONTINUOUS   PREPARE RBC SOFT        All Lab Results:  Results for orders placed or performed during the hospital encounter of 07/12/18    CBC auto differential   Result Value Ref Range    WBC 9.02 3.90 - 12.70 K/uL    RBC 3.38 (L) 4.00 - 5.40 M/uL    Hemoglobin 6.1 (L) 12.0 - 16.0 g/dL    Hematocrit 20.7 (L) 37.0 - 48.5 %    MCV 61 (L) 82 - 98 fL    MCH 18.0 (L) 27.0 - 31.0 pg    MCHC 29.5 (L) 32.0 - 36.0 g/dL    RDW 18.4 (H) 11.5 - 14.5 %    Platelets 268 150 - 350 K/uL    MPV 8.7 (L) 9.2 - 12.9 fL    Gran # (ANC) 7.8 (H) 1.8 - 7.7 K/uL    Lymph # 0.5 (L) 1.0 - 4.8 K/uL    Mono # 0.7 0.3 - 1.0 K/uL    Eos # 0.0 0.0 - 0.5 K/uL    Baso # 0.01 0.00 - 0.20 K/uL    Gran% 86.8 (H) 38.0 - 73.0 %    Lymph% 5.1 (L) 18.0 - 48.0 %    Mono% 8.2 4.0 - 15.0 %    Eosinophil% 0.2 0.0 - 8.0 %    Basophil% 0.1 0.0 - 1.9 %    Platelet Estimate Appears normal     Aniso Slight     Poik Slight     Poly Occasional     Hypo Marked     Ovalocytes Occasional     Target Cells Moderate     Tear Drop Cells Occasional     Stomatocytes Present     Spherocytes Occasional     Differential Method Automated    Comprehensive metabolic panel   Result Value Ref Range    Sodium 135 (L) 136 - 145 mmol/L    Potassium 3.4 (L) 3.5 - 5.1 mmol/L    Chloride 104 95 - 110 mmol/L    CO2 19 (L) 23 - 29 mmol/L    Glucose 273 (H) 70 - 110 mg/dL    BUN, Bld 10 6 - 20 mg/dL    Creatinine 0.9 0.5 - 1.4 mg/dL    Calcium 9.0 8.7 - 10.5 mg/dL    Total Protein 7.2 6.0 - 8.4 g/dL    Albumin 3.0 (L) 3.5 - 5.2 g/dL    Total Bilirubin 1.4 (H) 0.1 - 1.0 mg/dL    Alkaline Phosphatase 98 55 - 135 U/L    AST 30 10 - 40 U/L    ALT 18 10 - 44 U/L    Anion Gap 12 8 - 16 mmol/L    eGFR if African American >60 >60 mL/min/1.73 m^2    eGFR if non African American >60 >60 mL/min/1.73 m^2   Lipase   Result Value Ref Range    Lipase 8 4 - 60 U/L   Urinalysis   Result Value Ref Range    Specimen UA Urine, Clean Catch     Color, UA Yellow Yellow, Straw, Fe    Appearance, UA Clear Clear    pH, UA 6.0 5.0 - 8.0    Specific Gravity, UA 1.020 1.005 - 1.030    Protein, UA 2+ (A) Negative    Glucose, UA 2+ (A) Negative     Ketones, UA 3+ (A) Negative    Bilirubin (UA) Negative Negative    Occult Blood UA 2+ (A) Negative    Nitrite, UA Positive (A) Negative    Urobilinogen, UA Negative <2.0 EU/dL    Leukocytes, UA 1+ (A) Negative   Pregnancy, urine rapid   Result Value Ref Range    Preg Test, Ur Negative    Beta - Hydroxybutyrate, Serum   Result Value Ref Range    Beta-Hydroxybutyrate 0.8 (H) 0.0 - 0.5 mmol/L   Urinalysis Microscopic   Result Value Ref Range    RBC, UA 7 (H) 0 - 4 /hpf    WBC, UA >100 (H) 0 - 5 /hpf    WBC Clumps, UA Rare None-Rare    Bacteria, UA Moderate (A) None-Occ /hpf    Squam Epithel, UA 4 /hpf    Hyaline Casts, UA 0 0-1/lpf /lpf    Microscopic Comment SEE COMMENT    Lactic acid, plasma   Result Value Ref Range    Lactate (Lactic Acid) 1.2 0.5 - 2.2 mmol/L   POCT glucose   Result Value Ref Range    POC Glucose 287 (A) 70 - 110 MG/DL   POCT glucose   Result Value Ref Range    POCT Glucose 287 (H) 70 - 110 mg/dL   POCT glucose   Result Value Ref Range    POCT Glucose 279 (H) 70 - 110 mg/dL       Imaging Results:  Imaging Results          X-Ray Chest AP Portable (Final result)  Result time 07/12/18 14:17:50    Final result by MALOCLM Moncada Sr., MD (07/12/18 14:17:50)                 Impression:      Normal study.      Electronically signed by: Luc Moncada MD  Date:    07/12/2018  Time:    14:17             Narrative:    EXAMINATION:  XR CHEST AP PORTABLE    CLINICAL HISTORY:  fever;    COMPARISON:  None    FINDINGS:  The size of the heart is normal. The lungs are clear. There is no pneumothorax.  The costophrenic angles are sharp.                                 The EKG was ordered, reviewed, and independently interpreted by the ED provider.  Interpretation time: 1551  Rate: 111 BPM  Rhythm: sinus tachycardia  Interpretation: Nonspecific ST and T wave abnormality. No STEMI.             The Emergency Provider reviewed the vital signs and test results, which are outlined above.    ED Discussion     3:20 PM:  "Labs reviewed and d/w pt. Pt states that in 2014 she received transfusions for anemia due to pt's heavy cycles which are caused by fibroids.    3:22 PM: Re-evaluated pt. I have discussed test results, shared treatment plan, and the need for admission with patient and family at bedside. Pt and family express understanding at this time and agree with all information. All questions answered. Pt and family have no further questions or concerns at this time. Pt is ready for admit.    3:36 PM: Discussed case with Justin Chavarria NP ([facility]) (Hospital Medicine). Dr. Holloway agrees with current care and management of pt and accepts admission.   Admitting Service: Hospital medicine   Admitting Physician: Dr. Holloway  Admit to: Obs Tele        ED Medication(s):  Medications   0.9%  NaCl infusion (for blood administration) (not administered)   insulin regular injection 6 Units (not administered)   sodium chloride 0.9% bolus 1,000 mL (0 mLs Intravenous Stopped 7/12/18 1537)   ondansetron injection 4 mg (4 mg Intravenous Given 7/12/18 1311)   cefTRIAXone (ROCEPHIN) 1 g in dextrose 5 % 50 mL IVPB (0 g Intravenous Stopped 7/12/18 1538)   sodium chloride 0.9% bolus 1,000 mL (1,000 mLs Intravenous New Bag 7/12/18 1540)       New Prescriptions    No medications on file             Medical Decision Making    Medical Decision Making:   Clinical Tests:   Lab Tests: Ordered and Reviewed  Radiological Study: Ordered and Reviewed  Medical Tests: Ordered and Reviewed           Scribe Attestation:   Scribe #1: I performed the above scribed service and the documentation accurately describes the services I performed. I attest to the accuracy of the note.    Attending:   Physician Attestation Statement for Scribe #1: I, Lito Bowman DO, personally performed the services described in this documentation, as scribed by Corinne Mack, in my presence, and it is both accurate and complete.          Clinical Impression       ICD-10-CM ICD-9-CM   1. " Non-intractable vomiting with nausea, unspecified vomiting type R11.2 787.01   2. Urinary tract infection without hematuria, site unspecified N39.0 599.0   3. Anemia, unspecified type D64.9 285.9       Disposition:   Disposition: Placed in Observation  Condition: Willis Bowman DO  07/12/18 160

## 2018-07-12 NOTE — ED NOTES
Pt lying in bed comfortably watching tv. AAO x 4. Skin warm and dry. Breath sounds even and unlabored with equal chest rise and fall. States no needs at this time.

## 2018-07-12 NOTE — ED NOTES
"Pt resting comfortably in bed. AAO x 4. Skin warm and dry. Breath sounds even and unlabored with equal chest rise and fall. Pt states "Im feeling better can I eat?"  Ok to eat per Dr Bowman.   Pt given a sandwich and juice  "

## 2018-07-13 PROBLEM — R78.81 BACTEREMIA DUE TO GRAM-NEGATIVE BACTERIA: Status: ACTIVE | Noted: 2018-07-13

## 2018-07-13 LAB
ALBUMIN SERPL BCP-MCNC: 2.4 G/DL
ALP SERPL-CCNC: 86 U/L
ALT SERPL W/O P-5'-P-CCNC: 18 U/L
ANION GAP SERPL CALC-SCNC: 8 MMOL/L
ANION GAP SERPL CALC-SCNC: 9 MMOL/L
AST SERPL-CCNC: 18 U/L
BASOPHILS # BLD AUTO: 0 K/UL
BASOPHILS NFR BLD: 0 %
BILIRUB DIRECT SERPL-MCNC: 1.5 MG/DL
BILIRUB SERPL-MCNC: 3 MG/DL
BILIRUB SERPL-MCNC: 3.1 MG/DL
BUN SERPL-MCNC: 10 MG/DL
BUN SERPL-MCNC: 5 MG/DL
CALCIUM SERPL-MCNC: 8.5 MG/DL
CALCIUM SERPL-MCNC: 9.1 MG/DL
CHLORIDE SERPL-SCNC: 104 MMOL/L
CHLORIDE SERPL-SCNC: 104 MMOL/L
CO2 SERPL-SCNC: 22 MMOL/L
CO2 SERPL-SCNC: 23 MMOL/L
CREAT SERPL-MCNC: 0.9 MG/DL
CREAT SERPL-MCNC: 1 MG/DL
DIFFERENTIAL METHOD: ABNORMAL
EOSINOPHIL # BLD AUTO: 0 K/UL
EOSINOPHIL NFR BLD: 0.3 %
ERYTHROCYTE [DISTWIDTH] IN BLOOD BY AUTOMATED COUNT: 21.2 %
EST. GFR  (AFRICAN AMERICAN): >60 ML/MIN/1.73 M^2
EST. GFR  (AFRICAN AMERICAN): >60 ML/MIN/1.73 M^2
EST. GFR  (NON AFRICAN AMERICAN): >60 ML/MIN/1.73 M^2
EST. GFR  (NON AFRICAN AMERICAN): >60 ML/MIN/1.73 M^2
GLUCOSE SERPL-MCNC: 246 MG/DL
GLUCOSE SERPL-MCNC: 301 MG/DL
HCT VFR BLD AUTO: 23.5 %
HGB BLD-MCNC: 7.3 G/DL
HGB BLD-MCNC: 7.7 G/DL
INR PPP: 1.1
LDH SERPL L TO P-CCNC: 208 U/L
LYMPHOCYTES # BLD AUTO: 1 K/UL
LYMPHOCYTES NFR BLD: 11.8 %
MAGNESIUM SERPL-MCNC: 1.3 MG/DL
MAGNESIUM SERPL-MCNC: 2 MG/DL
MCH RBC QN AUTO: 20.2 PG
MCHC RBC AUTO-ENTMCNC: 31.1 G/DL
MCV RBC AUTO: 65 FL
MONOCYTES # BLD AUTO: 0.7 K/UL
MONOCYTES NFR BLD: 8.1 %
NEUTROPHILS # BLD AUTO: 7 K/UL
NEUTROPHILS NFR BLD: 80.1 %
PHOSPHATE SERPL-MCNC: 2.9 MG/DL
PLATELET # BLD AUTO: 227 K/UL
PMV BLD AUTO: 9.1 FL
POCT GLUCOSE: 271 MG/DL (ref 70–110)
POCT GLUCOSE: 293 MG/DL (ref 70–110)
POCT GLUCOSE: 297 MG/DL (ref 70–110)
POCT GLUCOSE: 344 MG/DL (ref 70–110)
POTASSIUM SERPL-SCNC: 3 MMOL/L
POTASSIUM SERPL-SCNC: 3.3 MMOL/L
PROT SERPL-MCNC: 6.1 G/DL
PROTHROMBIN TIME: 12 SEC
RBC # BLD AUTO: 3.62 M/UL
SODIUM SERPL-SCNC: 135 MMOL/L
SODIUM SERPL-SCNC: 135 MMOL/L
WBC # BLD AUTO: 8.75 K/UL

## 2018-07-13 PROCEDURE — 25000003 PHARM REV CODE 250: Performed by: EMERGENCY MEDICINE

## 2018-07-13 PROCEDURE — 83036 HEMOGLOBIN GLYCOSYLATED A1C: CPT

## 2018-07-13 PROCEDURE — 11000001 HC ACUTE MED/SURG PRIVATE ROOM

## 2018-07-13 PROCEDURE — 93005 ELECTROCARDIOGRAM TRACING: CPT

## 2018-07-13 PROCEDURE — 85610 PROTHROMBIN TIME: CPT

## 2018-07-13 PROCEDURE — 25000003 PHARM REV CODE 250: Performed by: NURSE PRACTITIONER

## 2018-07-13 PROCEDURE — 63600175 PHARM REV CODE 636 W HCPCS: Performed by: INTERNAL MEDICINE

## 2018-07-13 PROCEDURE — 84100 ASSAY OF PHOSPHORUS: CPT

## 2018-07-13 PROCEDURE — 83735 ASSAY OF MAGNESIUM: CPT

## 2018-07-13 PROCEDURE — 82248 BILIRUBIN DIRECT: CPT

## 2018-07-13 PROCEDURE — 80048 BASIC METABOLIC PNL TOTAL CA: CPT

## 2018-07-13 PROCEDURE — 25000003 PHARM REV CODE 250: Performed by: INTERNAL MEDICINE

## 2018-07-13 PROCEDURE — 85025 COMPLETE CBC W/AUTO DIFF WBC: CPT

## 2018-07-13 PROCEDURE — 93010 ELECTROCARDIOGRAM REPORT: CPT | Mod: ,,, | Performed by: INTERNAL MEDICINE

## 2018-07-13 PROCEDURE — 82247 BILIRUBIN TOTAL: CPT

## 2018-07-13 PROCEDURE — 85018 HEMOGLOBIN: CPT

## 2018-07-13 PROCEDURE — 80053 COMPREHEN METABOLIC PANEL: CPT

## 2018-07-13 PROCEDURE — 36415 COLL VENOUS BLD VENIPUNCTURE: CPT

## 2018-07-13 PROCEDURE — 99900035 HC TECH TIME PER 15 MIN (STAT)

## 2018-07-13 PROCEDURE — 83615 LACTATE (LD) (LDH) ENZYME: CPT

## 2018-07-13 RX ORDER — POTASSIUM CHLORIDE 20 MEQ/1
20 TABLET, EXTENDED RELEASE ORAL 3 TIMES DAILY
Status: DISPENSED | OUTPATIENT
Start: 2018-07-13 | End: 2018-07-15

## 2018-07-13 RX ORDER — IBUPROFEN 400 MG/1
400 TABLET ORAL ONCE
Status: COMPLETED | OUTPATIENT
Start: 2018-07-13 | End: 2018-07-13

## 2018-07-13 RX ORDER — MAGNESIUM SULFATE 1 G/100ML
1 INJECTION INTRAVENOUS
Status: COMPLETED | OUTPATIENT
Start: 2018-07-13 | End: 2018-07-13

## 2018-07-13 RX ORDER — ALPRAZOLAM 1 MG/1
1 TABLET ORAL 3 TIMES DAILY PRN
Status: DISCONTINUED | OUTPATIENT
Start: 2018-07-13 | End: 2018-07-14

## 2018-07-13 RX ORDER — POTASSIUM CHLORIDE 7.45 MG/ML
20 INJECTION INTRAVENOUS ONCE
Status: COMPLETED | OUTPATIENT
Start: 2018-07-13 | End: 2018-07-13

## 2018-07-13 RX ADMIN — ONDANSETRON 8 MG: 8 TABLET, ORALLY DISINTEGRATING ORAL at 04:07

## 2018-07-13 RX ADMIN — PIPERACILLIN AND TAZOBACTAM 4.5 G: 4; .5 INJECTION, POWDER, LYOPHILIZED, FOR SOLUTION INTRAVENOUS; PARENTERAL at 05:07

## 2018-07-13 RX ADMIN — ACETAMINOPHEN 650 MG: 325 TABLET, FILM COATED ORAL at 03:07

## 2018-07-13 RX ADMIN — ACETAMINOPHEN 650 MG: 325 TABLET, FILM COATED ORAL at 11:07

## 2018-07-13 RX ADMIN — ACETAMINOPHEN 650 MG: 325 TABLET, FILM COATED ORAL at 05:07

## 2018-07-13 RX ADMIN — POTASSIUM CHLORIDE 20 MEQ: 1500 TABLET, EXTENDED RELEASE ORAL at 08:07

## 2018-07-13 RX ADMIN — IBUPROFEN 400 MG: 400 TABLET, FILM COATED ORAL at 04:07

## 2018-07-13 RX ADMIN — SODIUM CHLORIDE, SODIUM LACTATE, POTASSIUM CHLORIDE, AND CALCIUM CHLORIDE: .6; .31; .03; .02 INJECTION, SOLUTION INTRAVENOUS at 01:07

## 2018-07-13 RX ADMIN — MAGNESIUM SULFATE IN DEXTROSE 1 G: 10 INJECTION, SOLUTION INTRAVENOUS at 09:07

## 2018-07-13 RX ADMIN — MAGNESIUM OXIDE TAB 400 MG (241.3 MG ELEMENTAL MG) 400 MG: 400 (241.3 MG) TAB at 08:07

## 2018-07-13 RX ADMIN — VENLAFAXINE HYDROCHLORIDE 75 MG: 75 CAPSULE, EXTENDED RELEASE ORAL at 08:07

## 2018-07-13 RX ADMIN — POTASSIUM CHLORIDE 20 MEQ: 1500 TABLET, EXTENDED RELEASE ORAL at 02:07

## 2018-07-13 RX ADMIN — INSULIN DETEMIR 20 UNITS: 100 INJECTION, SOLUTION SUBCUTANEOUS at 08:07

## 2018-07-13 RX ADMIN — SODIUM CHLORIDE, SODIUM LACTATE, POTASSIUM CHLORIDE, AND CALCIUM CHLORIDE: .6; .31; .03; .02 INJECTION, SOLUTION INTRAVENOUS at 08:07

## 2018-07-13 RX ADMIN — ALPRAZOLAM 1 MG: 1 TABLET ORAL at 10:07

## 2018-07-13 RX ADMIN — INSULIN ASPART 3 UNITS: 100 INJECTION, SOLUTION INTRAVENOUS; SUBCUTANEOUS at 06:07

## 2018-07-13 RX ADMIN — MAGNESIUM SULFATE IN DEXTROSE 1 G: 10 INJECTION, SOLUTION INTRAVENOUS at 12:07

## 2018-07-13 RX ADMIN — INSULIN ASPART 1 UNITS: 100 INJECTION, SOLUTION INTRAVENOUS; SUBCUTANEOUS at 08:07

## 2018-07-13 RX ADMIN — ALPRAZOLAM 1 MG: 1 TABLET ORAL at 03:07

## 2018-07-13 RX ADMIN — INSULIN ASPART 3 UNITS: 100 INJECTION, SOLUTION INTRAVENOUS; SUBCUTANEOUS at 05:07

## 2018-07-13 RX ADMIN — MAGNESIUM SULFATE IN DEXTROSE 1 G: 10 INJECTION, SOLUTION INTRAVENOUS at 08:07

## 2018-07-13 RX ADMIN — POTASSIUM CHLORIDE 20 MEQ: 10 INJECTION, SOLUTION INTRAVENOUS at 10:07

## 2018-07-13 RX ADMIN — MAGNESIUM SULFATE IN DEXTROSE 1 G: 10 INJECTION, SOLUTION INTRAVENOUS at 11:07

## 2018-07-13 RX ADMIN — CEFTRIAXONE 1 G: 1 INJECTION, SOLUTION INTRAVENOUS at 02:07

## 2018-07-13 RX ADMIN — INSULIN ASPART 4 UNITS: 100 INJECTION, SOLUTION INTRAVENOUS; SUBCUTANEOUS at 11:07

## 2018-07-13 NOTE — PLAN OF CARE
Problem: Patient Care Overview  Goal: Plan of Care Review  Outcome: Ongoing (interventions implemented as appropriate)  Pt in bed AAOx4. Plan of Care reviewed, Verbalized understanding.  Patient remained free from falls, fall precautions in place.   Pt is ST on monitor.    PIV CDI with LR running at 125ml/hr  Call bell and personal belongings within reach. Hourly rounding complete. Reminded to call for assistance.   No complaints at this time. Will continue to monitor.      Value Min Max   Temp 99.2 °F (37.3 °C) 103.2 °F (39.6 °C)   Pulse 109 147   Resp 14 29   BP: Systolic 94 130   BP: Diastolic 51 76   MAP (mmHg) 74 90   SpO2 97 % 100 %

## 2018-07-13 NOTE — HOSPITAL COURSE
Admitted for evaluation and treatment of symptomatic blood loss anemia and sepsis from right pyelonephritis.  Urinalysis with a pattern of inflammatory factor consistent with urinary tract infection.  Blood and urine cultures drawn.  Empirically started Ceftriaxone.  IV fluid resuscitation with 2 L normal saline followed by 1 L Lactated Ringers.  Ordered complete abdominal ultrasound to evaluate kidneys and gallbladder which showed bilateral pyelonephritis with enlargement or the right kidney.  Liver, gallbladder, and spleen normal.  Changed antibiotic back to Ceftriaxone.  Blood cultures grew Gram-negative rods.    7/14/18 Pt was seen and examined at bedside .  She  Was placed on o2 by nc overnight due to hypoxia . The cxr show pulmonary congestion .  She cont  Speaking fever .   7/15/18. CT abdomen show pylo  And lower lung consolidation .  She has 2 prbc  W/o any problem.   7/16/18 Pt states feel better . She cont speaking fever  7/17/19 Pt has been afebrile for more than 32 hrs  Pt was seen and examined at bedside . She was determined to be suitable for d/c

## 2018-07-13 NOTE — ASSESSMENT & PLAN NOTE
Accuchecks and low dose sliding scale.  Levemir 20 units nightly.  Diabetic diet.  Check hemoglobin A1c.

## 2018-07-13 NOTE — PLAN OF CARE
Dr Holloway made aware that pt is having trouble with cost of test strips. He is to write prescription for test strips prior to d/c home. Pt informed.    CM met with pt and her family at bedside. Pt lives in a one story house with her children. She is independent, denies falls and DME use. Pt stated that her insurance no longer covers test strips and she needs help with covering cost. Pt is employed and drives. Discharge plan, [facility] discussed and transitional folder with pt. CM name and number written on white board.  Payor: Medicaid     07/13/18 6783   Discharge Assessment   Assessment Type Discharge Planning Assessment   Confirmed/corrected address and phone number on facesheet? Yes   Assessment information obtained from? Patient   Expected Length of Stay (days) (Unknown at this time)   Communicated expected length of stay with patient/caregiver yes   Prior to hospitilization cognitive status: Alert/Oriented   Prior to hospitalization functional status: Independent   Current cognitive status: Alert/Oriented   Current Functional Status: Independent   Facility Arrived From: (Home)   Lives With child(shaun), dependent   Able to Return to Prior Arrangements yes   Is patient able to care for self after discharge? Yes   Who are your caregiver(s) and their phone number(s)? (Radha Lawrence mother [phone] and Topher Palacios father [phone])   Patient's perception of discharge disposition home or selfcare   Readmission Within The Last 30 Days no previous admission in last 30 days   Patient currently being followed by outpatient case management? No   Patient currently receives any other outside agency services? Yes   Equipment Currently Used at Home none   Do you have any problems affording any of your prescribed medications? (Pt stated her insurance no longer pays for test strips and pt needs help with covering cost.)   Is the patient taking medications as prescribed? yes   Does the patient have  transportation home? Yes   Transportation Available family or friend will provide   Dialysis Name and Scheduled days (n/a)   Does the patient receive services at the [facility]? No   Discharge Plan A Home with family   Discharge Plan B Home with family   Patient/Family In Agreement With Plan yes

## 2018-07-13 NOTE — ED NOTES
Chief Complaint   Patient presents with     RECHECK     Follow-up Thyroid medication   Nieves Jasso LPN   Pt resting in bed watching tv. AAO x 4. Skin warm and dry. Breath sounds even and unlabored with equal chest rise and fall. Pts BG level 348. Pt denies any pain, dizziness, chest pain or blurred vision

## 2018-07-13 NOTE — ASSESSMENT & PLAN NOTE
Urinalysis strongly suggestive of infection.  Right CVA tenderness.  Abdominal ultrasound.  Urine cultures and blood cultures drawn.  Initially received Ceftriaxone changed to Zosyn.  Add on serum Procalcitonin.  Continue IV fluid support.  Lactic acid normal.

## 2018-07-13 NOTE — H&P
[facility] Hospital Medicine  History & Physical    Patient Name: Ladi Gabriel  MRN: [id number]  Admission Date: 2018  Attending Physician: Lito Bowman DO   Primary Care Provider: Provider [employer]         Patient information was obtained from patient and ER records.     Subjective:     Principal Problem:Pyelonephritis    Chief Complaint:   Chief Complaint   Patient presents with    Emesis     vomiting and fever        HPI: Started feeling bad this Tuesday.  The first symptom she noted was her hands felt warm early in the day.  Later on she had fevers and chills and mid back pain.  Associated with weakness, dizziness, and shortness of breath walking around the house.  Later she developed nausea and vomiting.  She vomited a clear yellow liquid that tasted bitter.  Yesterday she went to an after-hours clinic and was given Tylenol and Ibuprofen for symptoms and sent home.  She continued to feel worse so came to the ER today.  Denies any recent blood loss and her menstrual cycles have been regular with normal volume. No other recent illness or known sick contacts.    Past Medical History:   Diagnosis Date    Depression     Diabetes     Uterine fibroids in pregnancy, postpartum condition     Vitamin D deficiency        Past Surgical History:   Procedure Laterality Date     SECTION         Review of patient's allergies indicates:  No Known Allergies    No current facility-administered medications on file prior to encounter.      Current Outpatient Prescriptions on File Prior to Encounter   Medication Sig    ALPRAZolam (XANAX) 1 MG tablet Take 1 mg by mouth 2 (two) times daily.    ergocalciferol (VITAMIN D2) 50,000 unit Cap Take 50,000 Units by mouth every 7 days.    ferrous sulfate 324 mg (65 mg iron) TbEC Take 325 mg by mouth once daily.    venlafaxine (EFFEXOR XR) 150 MG Cp24 Take 75 mg by mouth once daily.    [DISCONTINUED] metFORMIN (GLUCOPHAGE) 1000 MG tablet  Take 1,000 mg by mouth 2 (two) times daily with meals.     Family History     Problem Relation (Age of Onset)    Arthritis Mother    Gout Father    Hyperlipidemia Mother    Hypertension Mother, Father    No Known Problems Brother        Social History Main Topics    Smoking status: Never Smoker    Smokeless tobacco: Never Used    Alcohol use 3.6 oz/week     6 Glasses of wine per week    Drug use: No    Sexual activity: Not on file     Review of Systems   Constitutional: Positive for chills, fatigue and fever.   HENT: Negative for congestion and sore throat.    Eyes: Negative for visual disturbance.   Respiratory: Negative for cough, shortness of breath and wheezing.    Cardiovascular: Negative for chest pain, palpitations and leg swelling.   Gastrointestinal: Positive for nausea and vomiting. Negative for abdominal pain, blood in stool, constipation and diarrhea.   Genitourinary: Positive for flank pain. Negative for dysuria and hematuria.   Musculoskeletal: Positive for back pain. Negative for arthralgias.   Skin: Negative for rash and wound.   Neurological: Negative for dizziness, weakness, light-headedness and numbness.   Hematological: Negative for adenopathy.     Objective:     Vital Signs (Most Recent):  Temp: 99.8 °F (37.7 °C) (07/12/18 1859)  Pulse: (!) 145 (07/12/18 1859)  Resp: (!) 26 (07/12/18 1859)  BP: (!) 130/56 (07/12/18 1859)  SpO2: 98 % (07/12/18 1859) Vital Signs (24h Range):  Temp:  [99.4 °F (37.4 °C)-102.8 °F (39.3 °C)] 99.8 °F (37.7 °C)  Pulse:  [109-147] 145  Resp:  [14-29] 26  SpO2:  [98 %-100 %] 98 %  BP: (101-130)/(56-76) 130/56     Weight: 114.9 kg (253 lb 6.4 oz)  Body mass index is 43.5 kg/m².    Physical Exam   Constitutional: She is oriented to person, place, and time. She appears well-developed and well-nourished. No distress.   HENT:   Head: Normocephalic and atraumatic.   Mouth/Throat: Oropharynx is clear and moist.   Eyes: Conjunctivae and EOM are normal. Pupils are equal,  round, and reactive to light.   Neck: Neck supple. No JVD present. No thyromegaly present.   Cardiovascular: Regular rhythm.  Exam reveals no gallop and no friction rub.    No murmur heard.  Regular tachycardia.   Pulmonary/Chest: Effort normal and breath sounds normal. She has no wheezes. She has no rales.   Abdominal: Soft. Bowel sounds are normal. She exhibits no distension. There is tenderness. There is no rebound and no guarding.   Genitourinary:   Genitourinary Comments: Right CVA tenderness.   Musculoskeletal: Normal range of motion. She exhibits no edema or deformity.   Lymphadenopathy:     She has no cervical adenopathy.   Neurological: She is alert and oriented to person, place, and time. She has normal reflexes.   Skin: Skin is warm and dry. No rash noted.   Psychiatric: She has a normal mood and affect. Her behavior is normal. Judgment and thought content normal.   Nursing note and vitals reviewed.        CRANIAL NERVES     CN III, IV, VI   Pupils are equal, round, and reactive to light.  Extraocular motions are normal.        Significant Labs: All pertinent labs within the past 24 hours have been reviewed.    Significant Imaging: I have reviewed all pertinent imaging results/findings within the past 24 hours.    Assessment/Plan:     * Pyelonephritis    Urinalysis strongly suggestive of infection.  Right CVA tenderness.  Abdominal ultrasound.  Urine cultures and blood cultures drawn.  Initially received Ceftriaxone changed to Zosyn.  Add on serum Procalcitonin.  Continue IV fluid support.  Lactic acid normal.        Major depressive disorder    With anxiety component.  Continue Venlafaxine and Alprazolam at reduced dose.        Type 2 diabetes mellitus with hyperglycemia, without long-term current use of insulin    Accuchecks and low dose sliding scale.  Levemir 20 units nightly.  Diabetic diet.  Check hemoglobin A1c.        Acute blood loss anemia    Uncertain etiology but suspect excessive menstrual  bleeding related to uterine fibroids.  Transfuse 2 units PRBC.  Draw serum Iron studies.        Severe sepsis    Tachycardia and fever with exam and abnormal urinalysis consistent with right pyelonephritis.  IV fluid resuscitation.  Normal lactic acid.  Empiric antibiotics given and cultures drawn.  Imaging of the abdomen ordered.        Non-intractable vomiting with nausea    Bilious vomiting reported with right mid back pain.  Unlikely cholecystitis but she has risk factors.  Gallbladder ultrasound and change antibiotic to Zosyn.          VTE Risk Mitigation         Ordered     Place sequential compression device  Until discontinued      07/12/18 1824     Reason for No Pharmacological VTE Prophylaxis  Once      07/12/18 1824     IP VTE HIGH RISK PATIENT  Once      07/12/18 1824             Rc Holloway MD  Department of Hospital Medicine   [facility]

## 2018-07-13 NOTE — ASSESSMENT & PLAN NOTE
Uncertain etiology but suspect excessive menstrual bleeding related to uterine fibroids.  Transfuse 2 units PRBC.  Draw serum Iron studies.

## 2018-07-13 NOTE — PROGRESS NOTES
[facility] Hospital [facility]  Progress Note    Patient Name: Ladi Gabriel  MRN: [id number]  Patient Class: IP- Inpatient   Admission Date: 7/12/2018  Length of Stay: 1 days  Attending Physician: Rc Holloway MD  Primary Care Provider: Provider [employer]        Subjective:     Principal Problem:Pyelonephritis    HPI:  Started feeling bad this Tuesday.  The first symptom she noted was her hands felt warm early in the day.  Later on she had fevers and chills and mid back pain.  Associated with weakness, dizziness, and shortness of breath walking around the house.  Later she developed nausea and vomiting.  She vomited a clear yellow liquid that tasted bitter.  Yesterday she went to an after-hours clinic and was given Tylenol and Ibuprofen for symptoms and sent home.  She continued to feel worse so came to the ER today.  Denies any recent blood loss and her menstrual cycles have been regular with normal volume. No other recent illness or known sick contacts.    Hospital Course:  Admitted for evaluation and treatment of symptomatic blood loss anemia and sepsis from right pyelonephritis.  Urinalysis with a pattern of inflammatory factor consistent with urinary tract infection.  Blood and urine cultures drawn.  Empirically started Ceftriaxone.  IV fluid resuscitation with 2 L normal saline followed by 1 L Lactated Ringers.  Ordered complete abdominal ultrasound to evaluate kidneys and gallbladder which showed bilateral pyelonephritis with enlargement or the right kidney.  Liver, gallbladder, and spleen normal.  Changed antibiotic back to Ceftriaxone.  Blood cultures grew Gram-negative rods.    Interval History:  Intermittent shortness of breath.  Reduced appetite.    Review of Systems   Constitutional: Positive for chills and fever.   HENT: Negative for congestion and sore throat.    Eyes: Negative for visual disturbance.   Respiratory: Positive for shortness of breath. Negative for cough  and wheezing.    Cardiovascular: Negative for chest pain, palpitations and leg swelling.   Gastrointestinal: Negative for abdominal pain, blood in stool, constipation, diarrhea, nausea and vomiting.   Genitourinary: Negative for dysuria and hematuria.   Musculoskeletal: Positive for back pain. Negative for arthralgias.   Skin: Negative for rash and wound.   Neurological: Negative for dizziness, weakness, light-headedness and numbness.   Hematological: Negative for adenopathy.     Objective:     Vital Signs (Most Recent):  Temp: (!) 102.7 °F (39.3 °C) (07/13/18 1750)  Pulse: (!) 149 (07/13/18 1756)  Resp: 18 (07/13/18 1511)  BP: 136/70 (07/13/18 1615)  SpO2: (!) 94 % (07/13/18 1615) Vital Signs (24h Range):  Temp:  [98.5 °F (36.9 °C)-103.2 °F (39.6 °C)] 102.7 °F (39.3 °C)  Pulse:  [104-152] 149  Resp:  [16-26] 18  SpO2:  [94 %-100 %] 94 %  BP: ()/(51-93) 136/70     Weight: 114.4 kg (252 lb 3.3 oz)  Body mass index is 44.68 kg/m².    Intake/Output Summary (Last 24 hours) at 07/13/18 1837  Last data filed at 07/13/18 1318   Gross per 24 hour   Intake          2769.25 ml   Output              450 ml   Net          2319.25 ml      Physical Exam   Constitutional: She is oriented to person, place, and time. She appears well-developed and well-nourished. No distress.   HENT:   Head: Normocephalic and atraumatic.   Mouth/Throat: Oropharynx is clear and moist.   Eyes: Conjunctivae and EOM are normal. Pupils are equal, round, and reactive to light.   Neck: Neck supple. No JVD present. No thyromegaly present.   Cardiovascular: Regular rhythm.  Exam reveals no gallop and no friction rub.    No murmur heard.  Tachycardia   Pulmonary/Chest: Effort normal and breath sounds normal. She has no wheezes. She has no rales.   Abdominal: Soft. Bowel sounds are normal. She exhibits no distension. There is no tenderness. There is no rebound and no guarding.   Genitourinary:   Genitourinary Comments: CVA tenderness   Musculoskeletal:  Normal range of motion. She exhibits no edema or deformity.   Lymphadenopathy:     She has no cervical adenopathy.   Neurological: She is alert and oriented to person, place, and time. She has normal reflexes.   Skin: Skin is warm and dry. No rash noted.   Psychiatric: She has a normal mood and affect. Her behavior is normal. Judgment and thought content normal.   Nursing note and vitals reviewed.      Significant Labs: All pertinent labs within the past 24 hours have been reviewed.    Significant Imaging: I have reviewed all pertinent imaging results/findings within the past 24 hours.    Assessment/Plan:      * Pyelonephritis    Urinalysis strongly suggestive of infection.  Right CVA tenderness.  Abdominal ultrasound showed bilateral pyelonephritis with enlargement of the right kidney.  Urine cultures and blood cultures drawn.  Initially received Ceftriaxone changed to Zosyn for two doses then back to Ceftriaxone after US showed normal gallbladder.  Serum Procalcitonin very high at 22.6.  Continue IV fluid support.  Lactic acid normal.        Bacteremia due to Gram-negative bacteria    Probably due to bilateral upper urinary tract infection.  Continue Ceftriaxone.  Await ID and sensitivities and correlate with urine culture.        Major depressive disorder    With anxiety component.  Continue Venlafaxine and Alprazolam at reduced dose.        Type 2 diabetes mellitus with hyperglycemia, without long-term current use of insulin    Accuchecks and low dose sliding scale.  Levemir 20 units nightly.  Diabetic diet.  Check hemoglobin A1c.        Acute blood loss anemia    Uncertain etiology but suspect excessive menstrual bleeding related to uterine fibroids.  Transfuse 2 units PRBC.  Draw serum Iron studies.        Severe sepsis    Tachycardia and fever with exam and abnormal urinalysis consistent with right pyelonephritis.  IV fluid resuscitation.  Normal lactic acid.  Empiric antibiotics given and cultures drawn.   Imaging of the abdomen showed upper urinary tract infection.  Bilirubin elevated probably due to biliary stasis from sepsis.        Non-intractable vomiting with nausea    Bilious vomiting reported with right mid back pain.  Unlikely cholecystitis but she has risk factors.  Gallbladder ultrasound and change antibiotic to Zosyn.          VTE Risk Mitigation         Ordered     Place sequential compression device  Until discontinued      07/12/18 1824     Reason for No Pharmacological VTE Prophylaxis  Once      07/12/18 1824     IP VTE HIGH RISK PATIENT  Once      07/12/18 1824              Rc Holloway MD  Department of Hospital Medicine   [facility]

## 2018-07-13 NOTE — PROGRESS NOTES
Patient HR ranging from 140s-160s. Low grade codie Lehman NP notified. New orders put in, will continue to monitor.

## 2018-07-13 NOTE — ASSESSMENT & PLAN NOTE
Bilious vomiting reported with right mid back pain.  Unlikely cholecystitis but she has risk factors.  Gallbladder ultrasound and change antibiotic to Zosyn.

## 2018-07-13 NOTE — ASSESSMENT & PLAN NOTE
Tachycardia and fever with exam and abnormal urinalysis consistent with right pyelonephritis.  IV fluid resuscitation.  Normal lactic acid.  Empiric antibiotics given and cultures drawn.  Imaging of the abdomen showed upper urinary tract infection.  Bilirubin elevated probably due to biliary stasis from sepsis.

## 2018-07-13 NOTE — SUBJECTIVE & OBJECTIVE
Interval History:  Intermittent shortness of breath.  Reduced appetite.    Review of Systems   Constitutional: Positive for chills and fever.   HENT: Negative for congestion and sore throat.    Eyes: Negative for visual disturbance.   Respiratory: Positive for shortness of breath. Negative for cough and wheezing.    Cardiovascular: Negative for chest pain, palpitations and leg swelling.   Gastrointestinal: Negative for abdominal pain, blood in stool, constipation, diarrhea, nausea and vomiting.   Genitourinary: Negative for dysuria and hematuria.   Musculoskeletal: Positive for back pain. Negative for arthralgias.   Skin: Negative for rash and wound.   Neurological: Negative for dizziness, weakness, light-headedness and numbness.   Hematological: Negative for adenopathy.     Objective:     Vital Signs (Most Recent):  Temp: (!) 102.7 °F (39.3 °C) (07/13/18 1750)  Pulse: (!) 149 (07/13/18 1756)  Resp: 18 (07/13/18 1511)  BP: 136/70 (07/13/18 1615)  SpO2: (!) 94 % (07/13/18 1615) Vital Signs (24h Range):  Temp:  [98.5 °F (36.9 °C)-103.2 °F (39.6 °C)] 102.7 °F (39.3 °C)  Pulse:  [104-152] 149  Resp:  [16-26] 18  SpO2:  [94 %-100 %] 94 %  BP: ()/(51-93) 136/70     Weight: 114.4 kg (252 lb 3.3 oz)  Body mass index is 44.68 kg/m².    Intake/Output Summary (Last 24 hours) at 07/13/18 1837  Last data filed at 07/13/18 1318   Gross per 24 hour   Intake          2769.25 ml   Output              450 ml   Net          2319.25 ml      Physical Exam   Constitutional: She is oriented to person, place, and time. She appears well-developed and well-nourished. No distress.   HENT:   Head: Normocephalic and atraumatic.   Mouth/Throat: Oropharynx is clear and moist.   Eyes: Conjunctivae and EOM are normal. Pupils are equal, round, and reactive to light.   Neck: Neck supple. No JVD present. No thyromegaly present.   Cardiovascular: Regular rhythm.  Exam reveals no gallop and no friction rub.    No murmur heard.  Tachycardia    Pulmonary/Chest: Effort normal and breath sounds normal. She has no wheezes. She has no rales.   Abdominal: Soft. Bowel sounds are normal. She exhibits no distension. There is no tenderness. There is no rebound and no guarding.   Genitourinary:   Genitourinary Comments: CVA tenderness   Musculoskeletal: Normal range of motion. She exhibits no edema or deformity.   Lymphadenopathy:     She has no cervical adenopathy.   Neurological: She is alert and oriented to person, place, and time. She has normal reflexes.   Skin: Skin is warm and dry. No rash noted.   Psychiatric: She has a normal mood and affect. Her behavior is normal. Judgment and thought content normal.   Nursing note and vitals reviewed.      Significant Labs: All pertinent labs within the past 24 hours have been reviewed.    Significant Imaging: I have reviewed all pertinent imaging results/findings within the past 24 hours.

## 2018-07-13 NOTE — ED NOTES
"Pt resting comfortably in bed. AAO x 4. Skin warm and dry. Pt states "I feel much better" Breath sounds even and unlabored with equal chest rise and fall. States no needs at this time.   "

## 2018-07-13 NOTE — ASSESSMENT & PLAN NOTE
Tachycardia and fever with exam and abnormal urinalysis consistent with right pyelonephritis.  IV fluid resuscitation.  Normal lactic acid.  Empiric antibiotics given and cultures drawn.  Imaging of the abdomen ordered.

## 2018-07-13 NOTE — NURSING
Patient assessed for diabetes educational needs following chart review  She reports was diagnosed in 4/2018  She reports has a home glucose monitor and checks 3-4 times per day with a averages 150-180  She is consistent with administering her insulin until the past 2 days when she became ill  She was instructed in pen use per PCP    Reports proper insulin storage and site selection/rotation  She acknowledges to drinking large volumes of high calorie liquids (Nathan/Dr. Harris) daily Lengthy discussion on the importance of switching to diet, calorie-free, or sugar free liquids  She would benefit from an outpatient diabetes education class and will discuss with her PCP  Literature provided

## 2018-07-13 NOTE — SUBJECTIVE & OBJECTIVE
Past Medical History:   Diagnosis Date    Depression     Diabetes     Uterine fibroids in pregnancy, postpartum condition     Vitamin D deficiency        Past Surgical History:   Procedure Laterality Date     SECTION         Review of patient's allergies indicates:  No Known Allergies    No current facility-administered medications on file prior to encounter.      Current Outpatient Prescriptions on File Prior to Encounter   Medication Sig    ALPRAZolam (XANAX) 1 MG tablet Take 1 mg by mouth 2 (two) times daily.    ergocalciferol (VITAMIN D2) 50,000 unit Cap Take 50,000 Units by mouth every 7 days.    ferrous sulfate 324 mg (65 mg iron) TbEC Take 325 mg by mouth once daily.    venlafaxine (EFFEXOR XR) 150 MG Cp24 Take 75 mg by mouth once daily.    [DISCONTINUED] metFORMIN (GLUCOPHAGE) 1000 MG tablet Take 1,000 mg by mouth 2 (two) times daily with meals.     Family History     Problem Relation (Age of Onset)    Arthritis Mother    Gout Father    Hyperlipidemia Mother    Hypertension Mother, Father    No Known Problems Brother        Social History Main Topics    Smoking status: Never Smoker    Smokeless tobacco: Never Used    Alcohol use 3.6 oz/week     6 Glasses of wine per week    Drug use: No    Sexual activity: Not on file     Review of Systems   Constitutional: Positive for chills, fatigue and fever.   HENT: Negative for congestion and sore throat.    Eyes: Negative for visual disturbance.   Respiratory: Negative for cough, shortness of breath and wheezing.    Cardiovascular: Negative for chest pain, palpitations and leg swelling.   Gastrointestinal: Positive for nausea and vomiting. Negative for abdominal pain, blood in stool, constipation and diarrhea.   Genitourinary: Positive for flank pain. Negative for dysuria and hematuria.   Musculoskeletal: Positive for back pain. Negative for arthralgias.   Skin: Negative for rash and wound.   Neurological: Negative for dizziness, weakness,  light-headedness and numbness.   Hematological: Negative for adenopathy.     Objective:     Vital Signs (Most Recent):  Temp: 99.8 °F (37.7 °C) (07/12/18 1859)  Pulse: (!) 145 (07/12/18 1859)  Resp: (!) 26 (07/12/18 1859)  BP: (!) 130/56 (07/12/18 1859)  SpO2: 98 % (07/12/18 1859) Vital Signs (24h Range):  Temp:  [99.4 °F (37.4 °C)-102.8 °F (39.3 °C)] 99.8 °F (37.7 °C)  Pulse:  [109-147] 145  Resp:  [14-29] 26  SpO2:  [98 %-100 %] 98 %  BP: (101-130)/(56-76) 130/56     Weight: 114.9 kg (253 lb 6.4 oz)  Body mass index is 43.5 kg/m².    Physical Exam   Constitutional: She is oriented to person, place, and time. She appears well-developed and well-nourished. No distress.   HENT:   Head: Normocephalic and atraumatic.   Mouth/Throat: Oropharynx is clear and moist.   Eyes: Conjunctivae and EOM are normal. Pupils are equal, round, and reactive to light.   Neck: Neck supple. No JVD present. No thyromegaly present.   Cardiovascular: Regular rhythm.  Exam reveals no gallop and no friction rub.    No murmur heard.  Regular tachycardia.   Pulmonary/Chest: Effort normal and breath sounds normal. She has no wheezes. She has no rales.   Abdominal: Soft. Bowel sounds are normal. She exhibits no distension. There is tenderness. There is no rebound and no guarding.   Genitourinary:   Genitourinary Comments: Right CVA tenderness.   Musculoskeletal: Normal range of motion. She exhibits no edema or deformity.   Lymphadenopathy:     She has no cervical adenopathy.   Neurological: She is alert and oriented to person, place, and time. She has normal reflexes.   Skin: Skin is warm and dry. No rash noted.   Psychiatric: She has a normal mood and affect. Her behavior is normal. Judgment and thought content normal.   Nursing note and vitals reviewed.        CRANIAL NERVES     CN III, IV, VI   Pupils are equal, round, and reactive to light.  Extraocular motions are normal.        Significant Labs: All pertinent labs within the past 24 hours  have been reviewed.    Significant Imaging: I have reviewed all pertinent imaging results/findings within the past 24 hours.

## 2018-07-13 NOTE — PLAN OF CARE
Problem: Patient Care Overview  Goal: Plan of Care Review  Outcome: Ongoing (interventions implemented as appropriate)  Pt AAO x4.  VSS.  Pt remained afebrile throughout this shift.   IV fluids and abx administered per order.   Pt remained free of falls this shift.   Pt complained of a headache this shift.  Plan of care reviewed. Patient verbalizes understanding.   Pain meds administered as ordered.   Pt moving/turing independently.   Patient ST on monitor.   Bed low, side rails up x 2, wheels locked, call light in reach.   Patient instructed to call for assistance.   Hourly rounding completed.   Will continue to monitor.

## 2018-07-13 NOTE — ASSESSMENT & PLAN NOTE
Probably due to bilateral upper urinary tract infection.  Continue Ceftriaxone.  Await ID and sensitivities and correlate with urine culture.

## 2018-07-13 NOTE — ASSESSMENT & PLAN NOTE
Urinalysis strongly suggestive of infection.  Right CVA tenderness.  Abdominal ultrasound showed bilateral pyelonephritis with enlargement of the right kidney.  Urine cultures and blood cultures drawn.  Initially received Ceftriaxone changed to Zosyn for two doses then back to Ceftriaxone after US showed normal gallbladder.  Serum Procalcitonin very high at 22.6.  Continue IV fluid support.  Lactic acid normal.

## 2018-07-14 LAB
ALBUMIN SERPL BCP-MCNC: 2.4 G/DL
ALP SERPL-CCNC: 115 U/L
ALT SERPL W/O P-5'-P-CCNC: 30 U/L
ANION GAP SERPL CALC-SCNC: 8 MMOL/L
AST SERPL-CCNC: 30 U/L
BASOPHILS # BLD AUTO: 0.01 K/UL
BASOPHILS NFR BLD: 0.1 %
BILIRUB SERPL-MCNC: 1.7 MG/DL
BUN SERPL-MCNC: 5 MG/DL
CALCIUM SERPL-MCNC: 8.7 MG/DL
CHLORIDE SERPL-SCNC: 104 MMOL/L
CO2 SERPL-SCNC: 20 MMOL/L
CREAT SERPL-MCNC: 1 MG/DL
DIFFERENTIAL METHOD: ABNORMAL
EOSINOPHIL # BLD AUTO: 0.2 K/UL
EOSINOPHIL NFR BLD: 1.5 %
ERYTHROCYTE [DISTWIDTH] IN BLOOD BY AUTOMATED COUNT: 21.2 %
EST. GFR  (AFRICAN AMERICAN): >60 ML/MIN/1.73 M^2
EST. GFR  (NON AFRICAN AMERICAN): >60 ML/MIN/1.73 M^2
ESTIMATED AVG GLUCOSE: 177 MG/DL
GLUCOSE SERPL-MCNC: 320 MG/DL
HBA1C MFR BLD HPLC: 7.8 %
HCT VFR BLD AUTO: 22.8 %
HGB BLD-MCNC: 7.1 G/DL
LYMPHOCYTES # BLD AUTO: 1.5 K/UL
LYMPHOCYTES NFR BLD: 14.9 %
MAGNESIUM SERPL-MCNC: 1.6 MG/DL
MCH RBC QN AUTO: 19.7 PG
MCHC RBC AUTO-ENTMCNC: 31.1 G/DL
MCV RBC AUTO: 63 FL
MONOCYTES # BLD AUTO: 0.7 K/UL
MONOCYTES NFR BLD: 7.1 %
NEUTROPHILS # BLD AUTO: 7.4 K/UL
NEUTROPHILS NFR BLD: 76.9 %
PLATELET # BLD AUTO: 226 K/UL
PMV BLD AUTO: 8.8 FL
POCT GLUCOSE: 247 MG/DL (ref 70–110)
POCT GLUCOSE: 291 MG/DL (ref 70–110)
POCT GLUCOSE: 310 MG/DL (ref 70–110)
POTASSIUM SERPL-SCNC: 3.3 MMOL/L
PROT SERPL-MCNC: 6.6 G/DL
RBC # BLD AUTO: 3.61 M/UL
SODIUM SERPL-SCNC: 132 MMOL/L
WBC # BLD AUTO: 9.72 K/UL

## 2018-07-14 PROCEDURE — 85025 COMPLETE CBC W/AUTO DIFF WBC: CPT

## 2018-07-14 PROCEDURE — P9016 RBC LEUKOCYTES REDUCED: HCPCS

## 2018-07-14 PROCEDURE — 27000221 HC OXYGEN, UP TO 24 HOURS

## 2018-07-14 PROCEDURE — 36415 COLL VENOUS BLD VENIPUNCTURE: CPT

## 2018-07-14 PROCEDURE — 36430 TRANSFUSION BLD/BLD COMPNT: CPT

## 2018-07-14 PROCEDURE — 25000003 PHARM REV CODE 250: Performed by: EMERGENCY MEDICINE

## 2018-07-14 PROCEDURE — 25000003 PHARM REV CODE 250: Performed by: INTERNAL MEDICINE

## 2018-07-14 PROCEDURE — 25000003 PHARM REV CODE 250: Performed by: NURSE PRACTITIONER

## 2018-07-14 PROCEDURE — 80053 COMPREHEN METABOLIC PANEL: CPT

## 2018-07-14 PROCEDURE — 63600175 PHARM REV CODE 636 W HCPCS: Performed by: INTERNAL MEDICINE

## 2018-07-14 PROCEDURE — 83735 ASSAY OF MAGNESIUM: CPT

## 2018-07-14 PROCEDURE — 11000001 HC ACUTE MED/SURG PRIVATE ROOM

## 2018-07-14 PROCEDURE — 94761 N-INVAS EAR/PLS OXIMETRY MLT: CPT

## 2018-07-14 PROCEDURE — 63600175 PHARM REV CODE 636 W HCPCS: Performed by: NURSE PRACTITIONER

## 2018-07-14 PROCEDURE — 25000242 PHARM REV CODE 250 ALT 637 W/ HCPCS: Performed by: NURSE PRACTITIONER

## 2018-07-14 PROCEDURE — 94640 AIRWAY INHALATION TREATMENT: CPT

## 2018-07-14 PROCEDURE — 25500020 PHARM REV CODE 255: Performed by: INTERNAL MEDICINE

## 2018-07-14 RX ORDER — FUROSEMIDE 10 MG/ML
20 INJECTION INTRAMUSCULAR; INTRAVENOUS ONCE
Status: DISCONTINUED | OUTPATIENT
Start: 2018-07-14 | End: 2018-07-15

## 2018-07-14 RX ORDER — IPRATROPIUM BROMIDE AND ALBUTEROL SULFATE 2.5; .5 MG/3ML; MG/3ML
3 SOLUTION RESPIRATORY (INHALATION) EVERY 8 HOURS
Status: COMPLETED | OUTPATIENT
Start: 2018-07-15 | End: 2018-07-15

## 2018-07-14 RX ORDER — IBUPROFEN 400 MG/1
400 TABLET ORAL ONCE
Status: DISCONTINUED | OUTPATIENT
Start: 2018-07-14 | End: 2018-07-14

## 2018-07-14 RX ORDER — FUROSEMIDE 10 MG/ML
20 INJECTION INTRAMUSCULAR; INTRAVENOUS ONCE
Status: DISCONTINUED | OUTPATIENT
Start: 2018-07-14 | End: 2018-07-14 | Stop reason: DRUGHIGH

## 2018-07-14 RX ORDER — HYDROCODONE BITARTRATE AND ACETAMINOPHEN 500; 5 MG/1; MG/1
TABLET ORAL
Status: DISCONTINUED | OUTPATIENT
Start: 2018-07-14 | End: 2018-07-17 | Stop reason: HOSPADM

## 2018-07-14 RX ORDER — IBUPROFEN 600 MG/1
600 TABLET ORAL EVERY 6 HOURS PRN
Status: DISCONTINUED | OUTPATIENT
Start: 2018-07-14 | End: 2018-07-17 | Stop reason: HOSPADM

## 2018-07-14 RX ORDER — IPRATROPIUM BROMIDE AND ALBUTEROL SULFATE 2.5; .5 MG/3ML; MG/3ML
3 SOLUTION RESPIRATORY (INHALATION) EVERY 8 HOURS
Status: DISCONTINUED | OUTPATIENT
Start: 2018-07-14 | End: 2018-07-14

## 2018-07-14 RX ORDER — FUROSEMIDE 10 MG/ML
20 INJECTION INTRAMUSCULAR; INTRAVENOUS ONCE AS NEEDED
Status: ACTIVE | OUTPATIENT
Start: 2018-07-14 | End: 2018-07-14

## 2018-07-14 RX ORDER — HYDROCODONE BITARTRATE AND ACETAMINOPHEN 5; 325 MG/1; MG/1
1 TABLET ORAL EVERY 8 HOURS PRN
Status: DISCONTINUED | OUTPATIENT
Start: 2018-07-15 | End: 2018-07-17 | Stop reason: HOSPADM

## 2018-07-14 RX ORDER — IPRATROPIUM BROMIDE AND ALBUTEROL SULFATE 2.5; .5 MG/3ML; MG/3ML
3 SOLUTION RESPIRATORY (INHALATION) ONCE
Status: COMPLETED | OUTPATIENT
Start: 2018-07-14 | End: 2018-07-14

## 2018-07-14 RX ORDER — FUROSEMIDE 10 MG/ML
40 INJECTION INTRAMUSCULAR; INTRAVENOUS ONCE
Status: COMPLETED | OUTPATIENT
Start: 2018-07-14 | End: 2018-07-14

## 2018-07-14 RX ORDER — IBUPROFEN 400 MG/1
400 TABLET ORAL ONCE
Status: COMPLETED | OUTPATIENT
Start: 2018-07-14 | End: 2018-07-14

## 2018-07-14 RX ORDER — FUROSEMIDE 10 MG/ML
20 INJECTION INTRAMUSCULAR; INTRAVENOUS ONCE
Status: COMPLETED | OUTPATIENT
Start: 2018-07-14 | End: 2018-07-14

## 2018-07-14 RX ORDER — ONDANSETRON 2 MG/ML
4 INJECTION INTRAMUSCULAR; INTRAVENOUS EVERY 8 HOURS PRN
Status: DISCONTINUED | OUTPATIENT
Start: 2018-07-14 | End: 2018-07-17 | Stop reason: HOSPADM

## 2018-07-14 RX ORDER — ALPRAZOLAM 0.5 MG/1
0.5 TABLET ORAL 3 TIMES DAILY PRN
Status: DISCONTINUED | OUTPATIENT
Start: 2018-07-14 | End: 2018-07-17 | Stop reason: HOSPADM

## 2018-07-14 RX ADMIN — INSULIN ASPART 4 UNITS: 100 INJECTION, SOLUTION INTRAVENOUS; SUBCUTANEOUS at 06:07

## 2018-07-14 RX ADMIN — INSULIN ASPART 1 UNITS: 100 INJECTION, SOLUTION INTRAVENOUS; SUBCUTANEOUS at 10:07

## 2018-07-14 RX ADMIN — MAGNESIUM OXIDE TAB 400 MG (241.3 MG ELEMENTAL MG) 400 MG: 400 (241.3 MG) TAB at 08:07

## 2018-07-14 RX ADMIN — OXYCODONE HYDROCHLORIDE 5 MG: 5 TABLET ORAL at 06:07

## 2018-07-14 RX ADMIN — ACETAMINOPHEN 650 MG: 325 TABLET, FILM COATED ORAL at 01:07

## 2018-07-14 RX ADMIN — POTASSIUM CHLORIDE 20 MEQ: 1500 TABLET, EXTENDED RELEASE ORAL at 08:07

## 2018-07-14 RX ADMIN — PIPERACILLIN AND TAZOBACTAM 4.5 G: 4; .5 INJECTION, POWDER, LYOPHILIZED, FOR SOLUTION INTRAVENOUS; PARENTERAL at 01:07

## 2018-07-14 RX ADMIN — VENLAFAXINE HYDROCHLORIDE 75 MG: 75 CAPSULE, EXTENDED RELEASE ORAL at 08:07

## 2018-07-14 RX ADMIN — POTASSIUM CHLORIDE 20 MEQ: 1500 TABLET, EXTENDED RELEASE ORAL at 02:07

## 2018-07-14 RX ADMIN — PROMETHAZINE HYDROCHLORIDE 6.25 MG: 25 INJECTION INTRAMUSCULAR; INTRAVENOUS at 07:07

## 2018-07-14 RX ADMIN — FUROSEMIDE 20 MG: 10 INJECTION, SOLUTION INTRAVENOUS at 11:07

## 2018-07-14 RX ADMIN — POTASSIUM CHLORIDE 20 MEQ: 1500 TABLET, EXTENDED RELEASE ORAL at 10:07

## 2018-07-14 RX ADMIN — MAGNESIUM OXIDE TAB 400 MG (241.3 MG ELEMENTAL MG) 400 MG: 400 (241.3 MG) TAB at 10:07

## 2018-07-14 RX ADMIN — INSULIN DETEMIR 20 UNITS: 100 INJECTION, SOLUTION SUBCUTANEOUS at 10:07

## 2018-07-14 RX ADMIN — FUROSEMIDE 20 MG: 10 INJECTION, SOLUTION INTRAVENOUS at 12:07

## 2018-07-14 RX ADMIN — IOHEXOL 100 ML: 350 INJECTION, SOLUTION INTRAVENOUS at 01:07

## 2018-07-14 RX ADMIN — ONDANSETRON 8 MG: 8 TABLET, ORALLY DISINTEGRATING ORAL at 05:07

## 2018-07-14 RX ADMIN — ACETAMINOPHEN 650 MG: 325 TABLET, FILM COATED ORAL at 04:07

## 2018-07-14 RX ADMIN — IPRATROPIUM BROMIDE AND ALBUTEROL SULFATE 3 ML: .5; 3 SOLUTION RESPIRATORY (INHALATION) at 04:07

## 2018-07-14 RX ADMIN — ONDANSETRON 8 MG: 8 TABLET, ORALLY DISINTEGRATING ORAL at 03:07

## 2018-07-14 RX ADMIN — INSULIN ASPART 3 UNITS: 100 INJECTION, SOLUTION INTRAVENOUS; SUBCUTANEOUS at 05:07

## 2018-07-14 RX ADMIN — INSULIN ASPART 5 UNITS: 100 INJECTION, SOLUTION INTRAVENOUS; SUBCUTANEOUS at 11:07

## 2018-07-14 RX ADMIN — IBUPROFEN 400 MG: 400 TABLET, FILM COATED ORAL at 03:07

## 2018-07-14 RX ADMIN — IBUPROFEN 400 MG: 400 TABLET, FILM COATED ORAL at 04:07

## 2018-07-14 RX ADMIN — ALPRAZOLAM 1 MG: 1 TABLET ORAL at 03:07

## 2018-07-14 RX ADMIN — IBUPROFEN 600 MG: 600 TABLET ORAL at 05:07

## 2018-07-14 RX ADMIN — PIPERACILLIN AND TAZOBACTAM 4.5 G: 4; .5 INJECTION, POWDER, LYOPHILIZED, FOR SOLUTION INTRAVENOUS; PARENTERAL at 11:07

## 2018-07-14 RX ADMIN — FUROSEMIDE 40 MG: 10 INJECTION, SOLUTION INTRAMUSCULAR; INTRAVENOUS at 09:07

## 2018-07-14 RX ADMIN — ACETAMINOPHEN 650 MG: 325 TABLET, FILM COATED ORAL at 06:07

## 2018-07-14 NOTE — PROGRESS NOTES
Patient RR 16 with exp.wheezes. Duo-neb gave some relief. Without NC in place patient SATs less than 88%. Patient currently on 4LPM of O2 to SAT at 93%. Hemoglobin trending down from last result, now 7.1. Temp now less than 101F (oral). Reviewed findings with [facility] med.       New orders: Prep 2 units of pRBCs. CXR one time. Continue to monitor patient closely.    Patient AAOx4, answers questions appropriately. Heart monitor in place ST. Non-compliant with NC, patient takes it off while sleeping.

## 2018-07-14 NOTE — PROGRESS NOTES
Patient temp reassessed with an oral temp of 101.7F. Reported findings to hospital medicine. Tylenol Q4H has not assisted in dropping the temp further than 100.4F.       New Orders: Once time dose of Motrin 400mg oral tab. Continue to monitor patient vitals.

## 2018-07-14 NOTE — PLAN OF CARE
Pt AAOx4. POC discussed w/patient, verbalized understanding. ST on monitor, highest 153. VSS. Voids per BRP, independently without assist, steady gait noted. Patient turns independently in bed. Fall precautions in place, bed alarm REFUSED, will call for mobility, bed in lowest, call light and personal items within reach.    Temp being monitored more frequently. Highest reading 101.4F; lowest 99.1F. Patient reports no pain. Last dose of antiinfectives was: Rocephin 1gram Q24H at 1440 on 07/13/2018.

## 2018-07-14 NOTE — SUBJECTIVE & OBJECTIVE
Interval History: Pt was seen and examined at bedside .  She cont speaking fever .  The blood cx (+) for  GNR    Review of Systems   Constitutional: Positive for chills and fever.   HENT: Negative for congestion and sore throat.    Eyes: Negative for visual disturbance.   Respiratory: Positive for shortness of breath. Negative for cough and wheezing.    Cardiovascular: Negative for chest pain, palpitations and leg swelling.   Gastrointestinal: Negative for abdominal pain, blood in stool, constipation, diarrhea, nausea and vomiting.   Genitourinary: Negative for dysuria and hematuria.   Musculoskeletal: Positive for back pain. Negative for arthralgias.   Skin: Negative for rash and wound.   Neurological: Negative for dizziness, weakness, light-headedness and numbness.   Hematological: Negative for adenopathy.     Objective:     Vital Signs (Most Recent):  Temp: 98.8 °F (37.1 °C) (07/14/18 1113)  Pulse: (!) 124 (07/14/18 1113)  Resp: 20 (07/14/18 1113)  BP: 120/70 (07/14/18 1113)  SpO2: (!) 93 % (07/14/18 1113) Vital Signs (24h Range):  Temp:  [98.8 °F (37.1 °C)-103 °F (39.4 °C)] 98.8 °F (37.1 °C)  Pulse:  [109-152] 124  Resp:  [16-20] 20  SpO2:  [92 %-99 %] 93 %  BP: (113-136)/(70-93) 120/70     Weight: 114.4 kg (252 lb 3.3 oz)  Body mass index is 44.68 kg/m².    Intake/Output Summary (Last 24 hours) at 07/14/18 1215  Last data filed at 07/13/18 1854   Gross per 24 hour   Intake             1440 ml   Output                0 ml   Net             1440 ml      Physical Exam   Constitutional: She is oriented to person, place, and time. She appears well-developed and well-nourished. No distress.   HENT:   Head: Normocephalic and atraumatic.   Mouth/Throat: Oropharynx is clear and moist.   Eyes: Conjunctivae and EOM are normal. Pupils are equal, round, and reactive to light.   Neck: Neck supple. No JVD present. No thyromegaly present.   Cardiovascular: Regular rhythm.  Exam reveals no gallop and no friction rub.    No  murmur heard.  Tachycardia   Pulmonary/Chest: Effort normal and breath sounds normal. She has no wheezes. She has no rales.   Abdominal: Soft. Bowel sounds are normal. She exhibits no distension. There is no tenderness. There is no rebound and no guarding.   Genitourinary:   Genitourinary Comments: CVA tenderness   Musculoskeletal: Normal range of motion. She exhibits no edema or deformity.   Lymphadenopathy:     She has no cervical adenopathy.   Neurological: She is alert and oriented to person, place, and time. She has normal reflexes.   Skin: Skin is warm and dry. No rash noted.   Psychiatric: She has a normal mood and affect. Her behavior is normal. Judgment and thought content normal.   Nursing note and vitals reviewed.      Significant Labs: All pertinent labs within the past 24 hours have been reviewed.    Significant Imaging: I have reviewed all pertinent imaging results/findings within the past 24 hours.

## 2018-07-14 NOTE — PROGRESS NOTES
Reassessed temp and vitals for the evening. Patient's new temp 103F. Exp. Wheezing noted to upper damien. Lobes of lungs. Patient's breath sounds diminished. O2 Sats at 88% on RA. 2 LPM of NC has SATs at 93%. Reported findings to hospital med.     New orders: Administer Tylenol PRN with new order of Motrin 400mg oral tab one time dose. Duo-neb administered one time. Patient will be reassessed after medication administration for effectiveness.

## 2018-07-14 NOTE — PROGRESS NOTES
Reassess patient temperature: oral temp 101.4F.    Applied ice packs (3) to underarms and groin area, cooled room. Patient refused to remove extra blankets. Tylenol was given at 11pm. Will reassess temp before midnight to monitor effectiveness and report any changes to appropriate team.

## 2018-07-14 NOTE — PROGRESS NOTES
[facility] Hospital Medicine  Progress Note    Patient Name: Ladi Gabriel  MRN: [id number]  Patient Class: IP- Inpatient   Admission Date: 7/12/2018  Length of Stay: 2 days  Attending Physician: Tyshawn Noel, *  Primary Care Provider: Provider [employer]        Subjective:     Principal Problem:Pyelonephritis    HPI:  Started feeling bad this Tuesday.  The first symptom she noted was her hands felt warm early in the day.  Later on she had fevers and chills and mid back pain.  Associated with weakness, dizziness, and shortness of breath walking around the house.  Later she developed nausea and vomiting.  She vomited a clear yellow liquid that tasted bitter.  Yesterday she went to an after-hours clinic and was given Tylenol and Ibuprofen for symptoms and sent home.  She continued to feel worse so came to the ER today.  Denies any recent blood loss and her menstrual cycles have been regular with normal volume. No other recent illness or known sick contacts.    Hospital Course:  Admitted for evaluation and treatment of symptomatic blood loss anemia and sepsis from right pyelonephritis.  Urinalysis with a pattern of inflammatory factor consistent with urinary tract infection.  Blood and urine cultures drawn.  Empirically started Ceftriaxone.  IV fluid resuscitation with 2 L normal saline followed by 1 L Lactated Ringers.  Ordered complete abdominal ultrasound to evaluate kidneys and gallbladder which showed bilateral pyelonephritis with enlargement or the right kidney.  Liver, gallbladder, and spleen normal.  Changed antibiotic back to Ceftriaxone.  Blood cultures grew Gram-negative rods.    7/14/18 Pt was seen and examined at bedside .  She  Was placed on o2 by nc overnight due to hypoxia . The cxr show pulmonary congestion .  She cont  Speaking fever .     Interval History: Pt was seen and examined at bedside .  She cont speaking fever .  The blood cx (+) for  GNR    Review of  Systems   Constitutional: Positive for chills and fever.   HENT: Negative for congestion and sore throat.    Eyes: Negative for visual disturbance.   Respiratory: Positive for shortness of breath. Negative for cough and wheezing.    Cardiovascular: Negative for chest pain, palpitations and leg swelling.   Gastrointestinal: Negative for abdominal pain, blood in stool, constipation, diarrhea, nausea and vomiting.   Genitourinary: Negative for dysuria and hematuria.   Musculoskeletal: Positive for back pain. Negative for arthralgias.   Skin: Negative for rash and wound.   Neurological: Negative for dizziness, weakness, light-headedness and numbness.   Hematological: Negative for adenopathy.     Objective:     Vital Signs (Most Recent):  Temp: 98.8 °F (37.1 °C) (07/14/18 1113)  Pulse: (!) 124 (07/14/18 1113)  Resp: 20 (07/14/18 1113)  BP: 120/70 (07/14/18 1113)  SpO2: (!) 93 % (07/14/18 1113) Vital Signs (24h Range):  Temp:  [98.8 °F (37.1 °C)-103 °F (39.4 °C)] 98.8 °F (37.1 °C)  Pulse:  [109-152] 124  Resp:  [16-20] 20  SpO2:  [92 %-99 %] 93 %  BP: (113-136)/(70-93) 120/70     Weight: 114.4 kg (252 lb 3.3 oz)  Body mass index is 44.68 kg/m².    Intake/Output Summary (Last 24 hours) at 07/14/18 1215  Last data filed at 07/13/18 1854   Gross per 24 hour   Intake             1440 ml   Output                0 ml   Net             1440 ml      Physical Exam   Constitutional: She is oriented to person, place, and time. She appears well-developed and well-nourished. No distress.   HENT:   Head: Normocephalic and atraumatic.   Mouth/Throat: Oropharynx is clear and moist.   Eyes: Conjunctivae and EOM are normal. Pupils are equal, round, and reactive to light.   Neck: Neck supple. No JVD present. No thyromegaly present.   Cardiovascular: Regular rhythm.  Exam reveals no gallop and no friction rub.    No murmur heard.  Tachycardia   Pulmonary/Chest: Effort normal and breath sounds normal. She has no wheezes. She has no rales.    Abdominal: Soft. Bowel sounds are normal. She exhibits no distension. There is no tenderness. There is no rebound and no guarding.   Genitourinary:   Genitourinary Comments: CVA tenderness   Musculoskeletal: Normal range of motion. She exhibits no edema or deformity.   Lymphadenopathy:     She has no cervical adenopathy.   Neurological: She is alert and oriented to person, place, and time. She has normal reflexes.   Skin: Skin is warm and dry. No rash noted.   Psychiatric: She has a normal mood and affect. Her behavior is normal. Judgment and thought content normal.   Nursing note and vitals reviewed.      Significant Labs: All pertinent labs within the past 24 hours have been reviewed.    Significant Imaging: I have reviewed all pertinent imaging results/findings within the past 24 hours.    Assessment/Plan:      * Pyelonephritis    Change IVAB to zosyn   F/U CT abdomen    Blood cx (+)  For GNR          Bacteremia due to Gram-negative bacteria    Change IVAB to zosyn   F/u final report         Major depressive disorder    With anxiety component.  Continue Venlafaxine and Alprazolam at reduced dose.        Type 2 diabetes mellitus with hyperglycemia, without long-term current use of insulin    Accuchecks and low dose sliding scale.  Levemir 20 units nightly.  Diabetic diet.  Check hemoglobin A1c.        Acute blood loss anemia    S/P 2 prbc   H/h cont< 8  H/O menorrhagia   Transfuse 2 prbc         Severe sepsis    2/2 to pyelonephritis   Cont IVAB           Non-intractable vomiting with nausea    Improving \  Most likely 2/2 to  Sepsis and pylonephretis           VTE Risk Mitigation         Ordered     Place sequential compression device  Until discontinued      07/12/18 1824     Reason for No Pharmacological VTE Prophylaxis  Once      07/12/18 1824     IP VTE HIGH RISK PATIENT  Once      07/12/18 1824        Plan  Lasix 40 mg iv x 1 and then after last prbc  40 mg  Iv  X 1  Change IVAB  Zosyn   F/U ct abdomen    Transfuse 2 prbc       Tyshawn Noel MD  Department of Hospital Medicine   [facility]

## 2018-07-14 NOTE — PROGRESS NOTES
Patient refused iv therapy of potassium 20 mEq. Patient would like to continue her potassium by tablets from now on. Communicated to pharmacy the request to change this medication to oral tabs.

## 2018-07-14 NOTE — PLAN OF CARE
Problem: Patient Care Overview  Goal: Plan of Care Review  Outcome: Ongoing (interventions implemented as appropriate)  Pt AAO x4.  Temperature and HR elevated. BP, RR, and O2 sats on 4L WNL.    IV fluids and abx administered per order.   Pt remained free of falls this shift.   Pt complained of no pain this shift.  Plan of care reviewed. Patient verbalizes understanding.   Pt moving/turing independently.   Patient ST on monitor.   Bed low, side rails up x 2, wheels locked, call light in reach.   Patient instructed to call for assistance.   Hourly rounding completed.   Will continue to monitor.

## 2018-07-15 LAB
ANION GAP SERPL CALC-SCNC: 10 MMOL/L
ANISOCYTOSIS BLD QL SMEAR: ABNORMAL
BACTERIA BLD CULT: NORMAL
BASOPHILS # BLD AUTO: 0.02 K/UL
BASOPHILS NFR BLD: 0.2 %
BLD PROD TYP BPU: NORMAL
BLD PROD TYP BPU: NORMAL
BLOOD UNIT EXPIRATION DATE: NORMAL
BLOOD UNIT EXPIRATION DATE: NORMAL
BLOOD UNIT TYPE CODE: 6200
BLOOD UNIT TYPE CODE: 6200
BLOOD UNIT TYPE: NORMAL
BLOOD UNIT TYPE: NORMAL
BUN SERPL-MCNC: 10 MG/DL
CALCIUM SERPL-MCNC: 8.9 MG/DL
CHLORIDE SERPL-SCNC: 100 MMOL/L
CO2 SERPL-SCNC: 24 MMOL/L
CODING SYSTEM: NORMAL
CODING SYSTEM: NORMAL
CREAT SERPL-MCNC: 1 MG/DL
DACRYOCYTES BLD QL SMEAR: ABNORMAL
DIFFERENTIAL METHOD: ABNORMAL
DISPENSE STATUS: NORMAL
DISPENSE STATUS: NORMAL
EOSINOPHIL # BLD AUTO: 0.2 K/UL
EOSINOPHIL NFR BLD: 1.6 %
ERYTHROCYTE [DISTWIDTH] IN BLOOD BY AUTOMATED COUNT: 23.1 %
EST. GFR  (AFRICAN AMERICAN): >60 ML/MIN/1.73 M^2
EST. GFR  (NON AFRICAN AMERICAN): >60 ML/MIN/1.73 M^2
GLUCOSE SERPL-MCNC: 205 MG/DL
HCT VFR BLD AUTO: 27.7 %
HGB BLD-MCNC: 8.8 G/DL
HYPOCHROMIA BLD QL SMEAR: ABNORMAL
LYMPHOCYTES # BLD AUTO: 1.6 K/UL
LYMPHOCYTES NFR BLD: 13.5 %
MCH RBC QN AUTO: 21 PG
MCHC RBC AUTO-ENTMCNC: 31.8 G/DL
MCV RBC AUTO: 66 FL
MONOCYTES # BLD AUTO: 0.8 K/UL
MONOCYTES NFR BLD: 6.9 %
NEUTROPHILS # BLD AUTO: 9.3 K/UL
NEUTROPHILS NFR BLD: 79 %
NUM UNITS TRANS PACKED RBC: NORMAL
NUM UNITS TRANS PACKED RBC: NORMAL
OVALOCYTES BLD QL SMEAR: ABNORMAL
PLATELET # BLD AUTO: 275 K/UL
PLATELET BLD QL SMEAR: ABNORMAL
PMV BLD AUTO: 9.2 FL
POCT GLUCOSE: 268 MG/DL (ref 70–110)
POCT GLUCOSE: 294 MG/DL (ref 70–110)
POCT GLUCOSE: 307 MG/DL (ref 70–110)
POIKILOCYTOSIS BLD QL SMEAR: ABNORMAL
POLYCHROMASIA BLD QL SMEAR: ABNORMAL
POTASSIUM SERPL-SCNC: 3.4 MMOL/L
RBC # BLD AUTO: 4.19 M/UL
SODIUM SERPL-SCNC: 134 MMOL/L
SPHEROCYTES BLD QL SMEAR: ABNORMAL
STOMATOCYTES BLD QL SMEAR: PRESENT
TARGETS BLD QL SMEAR: ABNORMAL
WBC # BLD AUTO: 11.96 K/UL

## 2018-07-15 PROCEDURE — P9016 RBC LEUKOCYTES REDUCED: HCPCS

## 2018-07-15 PROCEDURE — 36430 TRANSFUSION BLD/BLD COMPNT: CPT

## 2018-07-15 PROCEDURE — 94799 UNLISTED PULMONARY SVC/PX: CPT

## 2018-07-15 PROCEDURE — 27000221 HC OXYGEN, UP TO 24 HOURS

## 2018-07-15 PROCEDURE — 11000001 HC ACUTE MED/SURG PRIVATE ROOM

## 2018-07-15 PROCEDURE — 25000242 PHARM REV CODE 250 ALT 637 W/ HCPCS: Performed by: INTERNAL MEDICINE

## 2018-07-15 PROCEDURE — 63600175 PHARM REV CODE 636 W HCPCS: Performed by: INTERNAL MEDICINE

## 2018-07-15 PROCEDURE — 63600175 PHARM REV CODE 636 W HCPCS: Performed by: NURSE PRACTITIONER

## 2018-07-15 PROCEDURE — 25000003 PHARM REV CODE 250: Performed by: INTERNAL MEDICINE

## 2018-07-15 PROCEDURE — 80048 BASIC METABOLIC PNL TOTAL CA: CPT

## 2018-07-15 PROCEDURE — 25000003 PHARM REV CODE 250: Performed by: EMERGENCY MEDICINE

## 2018-07-15 PROCEDURE — 94761 N-INVAS EAR/PLS OXIMETRY MLT: CPT

## 2018-07-15 PROCEDURE — 36415 COLL VENOUS BLD VENIPUNCTURE: CPT

## 2018-07-15 PROCEDURE — 94640 AIRWAY INHALATION TREATMENT: CPT

## 2018-07-15 PROCEDURE — 85025 COMPLETE CBC W/AUTO DIFF WBC: CPT

## 2018-07-15 PROCEDURE — 25000003 PHARM REV CODE 250: Performed by: NURSE PRACTITIONER

## 2018-07-15 RX ORDER — FUROSEMIDE 10 MG/ML
20 INJECTION INTRAMUSCULAR; INTRAVENOUS ONCE
Status: COMPLETED | OUTPATIENT
Start: 2018-07-15 | End: 2018-07-15

## 2018-07-15 RX ORDER — FUROSEMIDE 10 MG/ML
40 INJECTION INTRAMUSCULAR; INTRAVENOUS ONCE
Status: COMPLETED | OUTPATIENT
Start: 2018-07-15 | End: 2018-07-15

## 2018-07-15 RX ADMIN — ALPRAZOLAM 0.5 MG: 0.5 TABLET ORAL at 10:07

## 2018-07-15 RX ADMIN — FUROSEMIDE 20 MG: 10 INJECTION, SOLUTION INTRAVENOUS at 05:07

## 2018-07-15 RX ADMIN — INSULIN ASPART 3 UNITS: 100 INJECTION, SOLUTION INTRAVENOUS; SUBCUTANEOUS at 11:07

## 2018-07-15 RX ADMIN — IBUPROFEN 600 MG: 600 TABLET ORAL at 07:07

## 2018-07-15 RX ADMIN — POTASSIUM CHLORIDE 20 MEQ: 1500 TABLET, EXTENDED RELEASE ORAL at 02:07

## 2018-07-15 RX ADMIN — FUROSEMIDE 40 MG: 10 INJECTION, SOLUTION INTRAMUSCULAR; INTRAVENOUS at 08:07

## 2018-07-15 RX ADMIN — PIPERACILLIN AND TAZOBACTAM 4.5 G: 4; .5 INJECTION, POWDER, LYOPHILIZED, FOR SOLUTION INTRAVENOUS; PARENTERAL at 06:07

## 2018-07-15 RX ADMIN — ACETAMINOPHEN 650 MG: 325 TABLET, FILM COATED ORAL at 05:07

## 2018-07-15 RX ADMIN — VENLAFAXINE HYDROCHLORIDE 75 MG: 75 CAPSULE, EXTENDED RELEASE ORAL at 08:07

## 2018-07-15 RX ADMIN — ACETAMINOPHEN 650 MG: 325 TABLET, FILM COATED ORAL at 11:07

## 2018-07-15 RX ADMIN — PIPERACILLIN AND TAZOBACTAM 4.5 G: 4; .5 INJECTION, POWDER, LYOPHILIZED, FOR SOLUTION INTRAVENOUS; PARENTERAL at 02:07

## 2018-07-15 RX ADMIN — INSULIN ASPART 1 UNITS: 100 INJECTION, SOLUTION INTRAVENOUS; SUBCUTANEOUS at 08:07

## 2018-07-15 RX ADMIN — MAGNESIUM OXIDE TAB 400 MG (241.3 MG ELEMENTAL MG) 400 MG: 400 (241.3 MG) TAB at 08:07

## 2018-07-15 RX ADMIN — IPRATROPIUM BROMIDE AND ALBUTEROL SULFATE 3 ML: .5; 3 SOLUTION RESPIRATORY (INHALATION) at 07:07

## 2018-07-15 RX ADMIN — ACETAMINOPHEN 650 MG: 325 TABLET, FILM COATED ORAL at 08:07

## 2018-07-15 RX ADMIN — ONDANSETRON 4 MG: 2 INJECTION, SOLUTION INTRAMUSCULAR; INTRAVENOUS at 12:07

## 2018-07-15 RX ADMIN — INSULIN DETEMIR 20 UNITS: 100 INJECTION, SOLUTION SUBCUTANEOUS at 08:07

## 2018-07-15 RX ADMIN — POTASSIUM CHLORIDE 20 MEQ: 1500 TABLET, EXTENDED RELEASE ORAL at 08:07

## 2018-07-15 RX ADMIN — PROMETHAZINE HYDROCHLORIDE 6.25 MG: 25 INJECTION INTRAMUSCULAR; INTRAVENOUS at 07:07

## 2018-07-15 RX ADMIN — IPRATROPIUM BROMIDE AND ALBUTEROL SULFATE 3 ML: .5; 3 SOLUTION RESPIRATORY (INHALATION) at 03:07

## 2018-07-15 RX ADMIN — IPRATROPIUM BROMIDE AND ALBUTEROL SULFATE 3 ML: .5; 3 SOLUTION RESPIRATORY (INHALATION) at 12:07

## 2018-07-15 RX ADMIN — INSULIN ASPART 4 UNITS: 100 INJECTION, SOLUTION INTRAVENOUS; SUBCUTANEOUS at 04:07

## 2018-07-15 NOTE — PROGRESS NOTES
[facility] Hospital Medicine  Progress Note    Patient Name: Ladi Gabriel  MRN: [id number]  Patient Class: IP- Inpatient   Admission Date: 7/12/2018  Length of Stay: 3 days  Attending Physician: Tyshawn Noel, *  Primary Care Provider: Provider [employer]        Subjective:     Principal Problem:Pyelonephritis    HPI:  Started feeling bad this Tuesday.  The first symptom she noted was her hands felt warm early in the day.  Later on she had fevers and chills and mid back pain.  Associated with weakness, dizziness, and shortness of breath walking around the house.  Later she developed nausea and vomiting.  She vomited a clear yellow liquid that tasted bitter.  Yesterday she went to an after-hours clinic and was given Tylenol and Ibuprofen for symptoms and sent home.  She continued to feel worse so came to the ER today.  Denies any recent blood loss and her menstrual cycles have been regular with normal volume. No other recent illness or known sick contacts.    Hospital Course:  Admitted for evaluation and treatment of symptomatic blood loss anemia and sepsis from right pyelonephritis.  Urinalysis with a pattern of inflammatory factor consistent with urinary tract infection.  Blood and urine cultures drawn.  Empirically started Ceftriaxone.  IV fluid resuscitation with 2 L normal saline followed by 1 L Lactated Ringers.  Ordered complete abdominal ultrasound to evaluate kidneys and gallbladder which showed bilateral pyelonephritis with enlargement or the right kidney.  Liver, gallbladder, and spleen normal.  Changed antibiotic back to Ceftriaxone.  Blood cultures grew Gram-negative rods.    7/14/18 Pt was seen and examined at bedside .  She  Was placed on o2 by nc overnight due to hypoxia . The cxr show pulmonary congestion .  She cont  Speaking fever .   7/15/18. CT abdomen show pylo  And lower lung consolidation .  She has 2 prbc  W/o any problem.     Interval History: Pt was seen  and examined at bedside . She denies any complaint for today     Review of Systems   Constitutional: Positive for fever. Negative for chills.   HENT: Negative for congestion and sore throat.    Eyes: Negative for visual disturbance.   Respiratory: Positive for shortness of breath. Negative for cough and wheezing.    Cardiovascular: Negative for chest pain, palpitations and leg swelling.   Gastrointestinal: Negative for abdominal pain, blood in stool, constipation, diarrhea, nausea and vomiting.   Genitourinary: Negative for dysuria and hematuria.   Musculoskeletal: Positive for back pain. Negative for arthralgias.   Skin: Negative for rash and wound.   Neurological: Negative for dizziness, weakness, light-headedness and numbness.   Hematological: Negative for adenopathy.     Objective:     Vital Signs (Most Recent):  Temp: 99.3 °F (37.4 °C) (07/15/18 0712)  Pulse: (!) 116 (07/15/18 0915)  Resp: 20 (07/15/18 0747)  BP: (!) 120/56 (07/15/18 0712)  SpO2: (!) 93 % (07/15/18 0747) Vital Signs (24h Range):  Temp:  [97.9 °F (36.6 °C)-103.4 °F (39.7 °C)] 99.3 °F (37.4 °C)  Pulse:  [] 116  Resp:  [16-22] 20  SpO2:  [77 %-98 %] 93 %  BP: (111-147)/(56-92) 120/56     Weight: 114.4 kg (252 lb 3.3 oz)  Body mass index is 44.68 kg/m².    Intake/Output Summary (Last 24 hours) at 07/15/18 1041  Last data filed at 07/15/18 1032   Gross per 24 hour   Intake          1888.75 ml   Output             2200 ml   Net          -311.25 ml      Physical Exam   Constitutional: She is oriented to person, place, and time. She appears well-developed and well-nourished. No distress.   HENT:   Head: Normocephalic and atraumatic.   Mouth/Throat: Oropharynx is clear and moist.   Eyes: Conjunctivae and EOM are normal. Pupils are equal, round, and reactive to light.   Neck: Neck supple. No JVD present. No thyromegaly present.   Cardiovascular: Regular rhythm.  Exam reveals no gallop and no friction rub.    No murmur heard.  Tachycardia  "  Pulmonary/Chest: Effort normal and breath sounds normal. She has no wheezes. She has no rales.   Abdominal: Soft. Bowel sounds are normal. She exhibits no distension. There is no tenderness. There is no rebound and no guarding.   Genitourinary:   Genitourinary Comments: CVA tenderness   Musculoskeletal: Normal range of motion. She exhibits no edema or deformity.   Lymphadenopathy:     She has no cervical adenopathy.   Neurological: She is alert and oriented to person, place, and time. She has normal reflexes.   Skin: Skin is warm and dry. No rash noted.   Psychiatric: She has a normal mood and affect. Her behavior is normal. Judgment and thought content normal.   Nursing note and vitals reviewed.      Significant Labs: All pertinent labs within the past 24 hours have been reviewed.    Significant Imaging: I have reviewed all pertinent imaging results/findings within the past 24 hours.    Assessment/Plan:      * Pyelonephritis    Change IVAB to zosyn   CT abdomen  Show pylo   Blood cx (+) E.coly pan sensitive           Bacteremia due to Gram-negative bacteria    Change IVAB to zosyn   Order Status: Completed Specimen: Blood from Peripheral, Antecubital, Right Updated: 07/15/18 0720    Blood Culture, Routine Gram stain adrian bottle: Gram negative rods     Blood Culture, Routine Results called to and read back by: 07/13/2018  09:15    Blood Culture, Routine ESCHERICHIA COLI   Susceptibility      Escherichia coli     CULTURE, BLOOD     Amox/K Clav'ate <=8/4 "><=8/4  Sensitive     Amp/Sulbactam <=4/2 "><=4/2  Sensitive     Ampicillin 4  Sensitive     Cefazolin <=2 "><=2  Sensitive     Ceftriaxone <=1 "><=1  Sensitive     Ciprofloxacin <=0.5 "><=0.5  Sensitive     Ertapenem <=0.5 "><=0.5  Sensitive     Gentamicin <=1 "><=1  Sensitive     Meropenem <=1 "><=1  Sensitive     Piperacillin/Tazo <=8 "><=8  Sensitive     Tetracycline <=2 "><=2  Sensitive     Tobramycin <=2 "><=2  Sensitive     Trimeth/Sulfa <=0.5/9.5 "><=0.5/9.5 "  Sensitive                    Major depressive disorder    With anxiety component.  Continue Venlafaxine and Alprazolam at reduced dose.        Type 2 diabetes mellitus with hyperglycemia, without long-term current use of insulin    Accuchecks and low dose sliding scale.  Levemir 20 units nightly.  Diabetic diet.  Check hemoglobin A1c.        Acute blood loss anemia    S/P 4 prbc   H/h cont> 8  H/O menorrhagia           Severe sepsis    2/2 to pyelonephritis   Cont IVAB           Non-intractable vomiting with nausea    Improving \  Most likely 2/2 to  Sepsis and pylo          VTE Risk Mitigation         Ordered     Place sequential compression device  Until discontinued      07/12/18 1824     Reason for No Pharmacological VTE Prophylaxis  Once      07/12/18 1824     IP VTE HIGH RISK PATIENT  Once      07/12/18 1824              Tyshawn Noel MD  Department of Hospital Medicine   [facility]

## 2018-07-15 NOTE — PLAN OF CARE
Problem: Patient Care Overview  Goal: Plan of Care Review  Outcome: Ongoing (interventions implemented as appropriate)  POC reviewed with patient, verbalized understanding. Pt remains free from falls.  Fall precautions in place. ST /NS on cardiac monitor. Pt 02 saturation dropped into the 70's and was only able to get her up to 89% on 5L. New orders were given to administer a dose of IV lasix. PT received her last unit of blood during my shift and after that infusion pt 02 saturation increased to 99% so I titrated pt oxygen down to 3 LNC.  IV antibiotics also administered during my shift. Pt had a spike in her temp at the end of shift and tylenol was administered. No other complaints at this time. Call bell w/in reach. Reminded to call for assistance. BSC at the bedside for patient to use if she is feeling weak. BG monitored and sliding scale insulin administered as ordered.

## 2018-07-15 NOTE — SUBJECTIVE & OBJECTIVE
Interval History: Pt was seen and examined at bedside . She denies any complaint for today     Review of Systems   Constitutional: Positive for fever. Negative for chills.   HENT: Negative for congestion and sore throat.    Eyes: Negative for visual disturbance.   Respiratory: Positive for shortness of breath. Negative for cough and wheezing.    Cardiovascular: Negative for chest pain, palpitations and leg swelling.   Gastrointestinal: Negative for abdominal pain, blood in stool, constipation, diarrhea, nausea and vomiting.   Genitourinary: Negative for dysuria and hematuria.   Musculoskeletal: Positive for back pain. Negative for arthralgias.   Skin: Negative for rash and wound.   Neurological: Negative for dizziness, weakness, light-headedness and numbness.   Hematological: Negative for adenopathy.     Objective:     Vital Signs (Most Recent):  Temp: 99.3 °F (37.4 °C) (07/15/18 0712)  Pulse: (!) 116 (07/15/18 0915)  Resp: 20 (07/15/18 0747)  BP: (!) 120/56 (07/15/18 0712)  SpO2: (!) 93 % (07/15/18 0747) Vital Signs (24h Range):  Temp:  [97.9 °F (36.6 °C)-103.4 °F (39.7 °C)] 99.3 °F (37.4 °C)  Pulse:  [] 116  Resp:  [16-22] 20  SpO2:  [77 %-98 %] 93 %  BP: (111-147)/(56-92) 120/56     Weight: 114.4 kg (252 lb 3.3 oz)  Body mass index is 44.68 kg/m².    Intake/Output Summary (Last 24 hours) at 07/15/18 1041  Last data filed at 07/15/18 1032   Gross per 24 hour   Intake          1888.75 ml   Output             2200 ml   Net          -311.25 ml      Physical Exam   Constitutional: She is oriented to person, place, and time. She appears well-developed and well-nourished. No distress.   HENT:   Head: Normocephalic and atraumatic.   Mouth/Throat: Oropharynx is clear and moist.   Eyes: Conjunctivae and EOM are normal. Pupils are equal, round, and reactive to light.   Neck: Neck supple. No JVD present. No thyromegaly present.   Cardiovascular: Regular rhythm.  Exam reveals no gallop and no friction rub.    No murmur  heard.  Tachycardia   Pulmonary/Chest: Effort normal and breath sounds normal. She has no wheezes. She has no rales.   Abdominal: Soft. Bowel sounds are normal. She exhibits no distension. There is no tenderness. There is no rebound and no guarding.   Genitourinary:   Genitourinary Comments: CVA tenderness   Musculoskeletal: Normal range of motion. She exhibits no edema or deformity.   Lymphadenopathy:     She has no cervical adenopathy.   Neurological: She is alert and oriented to person, place, and time. She has normal reflexes.   Skin: Skin is warm and dry. No rash noted.   Psychiatric: She has a normal mood and affect. Her behavior is normal. Judgment and thought content normal.   Nursing note and vitals reviewed.      Significant Labs: All pertinent labs within the past 24 hours have been reviewed.    Significant Imaging: I have reviewed all pertinent imaging results/findings within the past 24 hours.

## 2018-07-15 NOTE — ASSESSMENT & PLAN NOTE
"Change IVAB to zosyn   Order Status: Completed Specimen: Blood from Peripheral, Antecubital, Right Updated: 07/15/18 0720    Blood Culture, Routine Gram stain adrian bottle: Gram negative rods     Blood Culture, Routine Results called to and read back by: 07/13/2018  09:15    Blood Culture, Routine ESCHERICHIA COLI   Susceptibility      Escherichia coli     CULTURE, BLOOD     Amox/K Clav'ate <=8/4 "><=8/4  Sensitive     Amp/Sulbactam <=4/2 "><=4/2  Sensitive     Ampicillin 4  Sensitive     Cefazolin <=2 "><=2  Sensitive     Ceftriaxone <=1 "><=1  Sensitive     Ciprofloxacin <=0.5 "><=0.5  Sensitive     Ertapenem <=0.5 "><=0.5  Sensitive     Gentamicin <=1 "><=1  Sensitive     Meropenem <=1 "><=1  Sensitive     Piperacillin/Tazo <=8 "><=8  Sensitive     Tetracycline <=2 "><=2  Sensitive     Tobramycin <=2 "><=2  Sensitive     Trimeth/Sulfa <=0.5/9.5 "><=0.5/9.5  Sensitive              "

## 2018-07-15 NOTE — PLAN OF CARE
Problem: Patient Care Overview  Goal: Plan of Care Review  Outcome: Ongoing (interventions implemented as appropriate)  Pt AAO x4.  BP, RR, O2 sats on 2.5L NC WNL. Patient temp 101.3 @ 1108. Tylenol administered.  IV fluids and abx administered per order.   Pt remained free of falls this shift.   Pt had no compliants of pain this shift.  Plan of care reviewed. Patient verbalizes understanding.   Pt moving/turing independently.   Patient ST on monitor.   Bed low, side rails up x 2, wheels locked, call light in reach.   Patient instructed to call for assistance.   Hourly rounding completed.   Will continue to monitor.

## 2018-07-16 LAB
ANION GAP SERPL CALC-SCNC: 11 MMOL/L
ANISOCYTOSIS BLD QL SMEAR: SLIGHT
BACTERIA UR CULT: NORMAL
BASOPHILS # BLD AUTO: 0.02 K/UL
BASOPHILS NFR BLD: 0.2 %
BUN SERPL-MCNC: 11 MG/DL
CALCIUM SERPL-MCNC: 9.2 MG/DL
CHLORIDE SERPL-SCNC: 101 MMOL/L
CO2 SERPL-SCNC: 25 MMOL/L
CREAT SERPL-MCNC: 1 MG/DL
DIFFERENTIAL METHOD: ABNORMAL
EOSINOPHIL # BLD AUTO: 0.2 K/UL
EOSINOPHIL NFR BLD: 1.7 %
ERYTHROCYTE [DISTWIDTH] IN BLOOD BY AUTOMATED COUNT: 23.3 %
EST. GFR  (AFRICAN AMERICAN): >60 ML/MIN/1.73 M^2
EST. GFR  (NON AFRICAN AMERICAN): >60 ML/MIN/1.73 M^2
GLUCOSE SERPL-MCNC: 289 MG/DL
HCT VFR BLD AUTO: 30.4 %
HGB BLD-MCNC: 9.4 G/DL
HYPOCHROMIA BLD QL SMEAR: ABNORMAL
LYMPHOCYTES # BLD AUTO: 1.7 K/UL
LYMPHOCYTES NFR BLD: 16.1 %
MCH RBC QN AUTO: 20.8 PG
MCHC RBC AUTO-ENTMCNC: 30.9 G/DL
MCV RBC AUTO: 67 FL
MONOCYTES # BLD AUTO: 1 K/UL
MONOCYTES NFR BLD: 9.1 %
NEUTROPHILS # BLD AUTO: 7.7 K/UL
NEUTROPHILS NFR BLD: 74.2 %
PLATELET # BLD AUTO: 295 K/UL
PLATELET BLD QL SMEAR: ABNORMAL
PMV BLD AUTO: 9 FL
POCT GLUCOSE: 307 MG/DL (ref 70–110)
POCT GLUCOSE: 313 MG/DL (ref 70–110)
POCT GLUCOSE: 334 MG/DL (ref 70–110)
POCT GLUCOSE: 391 MG/DL (ref 70–110)
POIKILOCYTOSIS BLD QL SMEAR: SLIGHT
POLYCHROMASIA BLD QL SMEAR: ABNORMAL
POTASSIUM SERPL-SCNC: 4.1 MMOL/L
RBC # BLD AUTO: 4.53 M/UL
SODIUM SERPL-SCNC: 137 MMOL/L
STOMATOCYTES BLD QL SMEAR: PRESENT
TARGETS BLD QL SMEAR: ABNORMAL
WBC # BLD AUTO: 10.61 K/UL

## 2018-07-16 PROCEDURE — 63600175 PHARM REV CODE 636 W HCPCS: Performed by: NURSE PRACTITIONER

## 2018-07-16 PROCEDURE — 63600175 PHARM REV CODE 636 W HCPCS: Performed by: INTERNAL MEDICINE

## 2018-07-16 PROCEDURE — 94761 N-INVAS EAR/PLS OXIMETRY MLT: CPT

## 2018-07-16 PROCEDURE — 25000003 PHARM REV CODE 250: Performed by: INTERNAL MEDICINE

## 2018-07-16 PROCEDURE — 36415 COLL VENOUS BLD VENIPUNCTURE: CPT

## 2018-07-16 PROCEDURE — 25000003 PHARM REV CODE 250: Performed by: NURSE PRACTITIONER

## 2018-07-16 PROCEDURE — 80048 BASIC METABOLIC PNL TOTAL CA: CPT

## 2018-07-16 PROCEDURE — 96372 THER/PROPH/DIAG INJ SC/IM: CPT

## 2018-07-16 PROCEDURE — 21400001 HC TELEMETRY ROOM

## 2018-07-16 PROCEDURE — 25000003 PHARM REV CODE 250: Performed by: EMERGENCY MEDICINE

## 2018-07-16 PROCEDURE — 94799 UNLISTED PULMONARY SVC/PX: CPT

## 2018-07-16 PROCEDURE — 85025 COMPLETE CBC W/AUTO DIFF WBC: CPT

## 2018-07-16 RX ORDER — INSULIN ASPART 100 [IU]/ML
1-10 INJECTION, SOLUTION INTRAVENOUS; SUBCUTANEOUS
Status: DISCONTINUED | OUTPATIENT
Start: 2018-07-16 | End: 2018-07-17 | Stop reason: HOSPADM

## 2018-07-16 RX ADMIN — PIPERACILLIN AND TAZOBACTAM 4.5 G: 4; .5 INJECTION, POWDER, LYOPHILIZED, FOR SOLUTION INTRAVENOUS; PARENTERAL at 12:07

## 2018-07-16 RX ADMIN — INSULIN ASPART 8 UNITS: 100 INJECTION, SOLUTION INTRAVENOUS; SUBCUTANEOUS at 06:07

## 2018-07-16 RX ADMIN — PIPERACILLIN AND TAZOBACTAM 4.5 G: 4; .5 INJECTION, POWDER, LYOPHILIZED, FOR SOLUTION INTRAVENOUS; PARENTERAL at 06:07

## 2018-07-16 RX ADMIN — PIPERACILLIN AND TAZOBACTAM 4.5 G: 4; .5 INJECTION, POWDER, LYOPHILIZED, FOR SOLUTION INTRAVENOUS; PARENTERAL at 02:07

## 2018-07-16 RX ADMIN — VENLAFAXINE HYDROCHLORIDE 75 MG: 75 CAPSULE, EXTENDED RELEASE ORAL at 08:07

## 2018-07-16 RX ADMIN — INSULIN ASPART 8 UNITS: 100 INJECTION, SOLUTION INTRAVENOUS; SUBCUTANEOUS at 11:07

## 2018-07-16 RX ADMIN — ALPRAZOLAM 0.5 MG: 0.5 TABLET ORAL at 09:07

## 2018-07-16 RX ADMIN — ACETAMINOPHEN 650 MG: 325 TABLET, FILM COATED ORAL at 08:07

## 2018-07-16 RX ADMIN — INSULIN ASPART 2 UNITS: 100 INJECTION, SOLUTION INTRAVENOUS; SUBCUTANEOUS at 09:07

## 2018-07-16 RX ADMIN — PIPERACILLIN AND TAZOBACTAM 4.5 G: 4; .5 INJECTION, POWDER, LYOPHILIZED, FOR SOLUTION INTRAVENOUS; PARENTERAL at 11:07

## 2018-07-16 RX ADMIN — HYDROCODONE BITARTRATE AND ACETAMINOPHEN 1 TABLET: 5; 325 TABLET ORAL at 02:07

## 2018-07-16 RX ADMIN — ALPRAZOLAM 0.5 MG: 0.5 TABLET ORAL at 11:07

## 2018-07-16 RX ADMIN — INSULIN ASPART 8 UNITS: 100 INJECTION, SOLUTION INTRAVENOUS; SUBCUTANEOUS at 05:07

## 2018-07-16 RX ADMIN — INSULIN DETEMIR 20 UNITS: 100 INJECTION, SOLUTION SUBCUTANEOUS at 08:07

## 2018-07-16 NOTE — SUBJECTIVE & OBJECTIVE
Interval History: pt was seen and examined at bedside . She states feeling better .     Review of Systems   Constitutional: Positive for fever. Negative for chills.   HENT: Negative for congestion and sore throat.    Eyes: Negative for visual disturbance.   Respiratory: Negative for cough, shortness of breath and wheezing.    Cardiovascular: Negative for chest pain, palpitations and leg swelling.   Gastrointestinal: Negative for abdominal pain, blood in stool, constipation, diarrhea, nausea and vomiting.   Genitourinary: Negative for dysuria and hematuria.   Musculoskeletal: Negative for arthralgias and back pain.   Skin: Negative for rash and wound.   Neurological: Negative for dizziness, weakness, light-headedness and numbness.   Hematological: Negative for adenopathy.     Objective:     Vital Signs (Most Recent):  Temp: 100.1 °F (37.8 °C) (07/16/18 1130)  Pulse: (!) 113 (07/16/18 1332)  Resp: 18 (07/16/18 1130)  BP: (!) 143/87 (07/16/18 1130)  SpO2: 96 % (07/16/18 1130) Vital Signs (24h Range):  Temp:  [97.8 °F (36.6 °C)-103.2 °F (39.6 °C)] 100.1 °F (37.8 °C)  Pulse:  [] 113  Resp:  [18-20] 18  SpO2:  [91 %-100 %] 96 %  BP: (107-144)/() 143/87     Weight: 114.4 kg (252 lb 3.3 oz)  Body mass index is 44.68 kg/m².    Intake/Output Summary (Last 24 hours) at 07/16/18 1417  Last data filed at 07/16/18 0800   Gross per 24 hour   Intake              680 ml   Output              600 ml   Net               80 ml      Physical Exam   Constitutional: She is oriented to person, place, and time. She appears well-developed and well-nourished. No distress.   HENT:   Head: Normocephalic and atraumatic.   Mouth/Throat: Oropharynx is clear and moist.   Eyes: Conjunctivae and EOM are normal. Pupils are equal, round, and reactive to light.   Neck: Neck supple. No JVD present. No thyromegaly present.   Cardiovascular: Regular rhythm.  Exam reveals no gallop and no friction rub.    No murmur heard.  Pulmonary/Chest:  Effort normal and breath sounds normal. She has no wheezes. She has no rales.   Abdominal: Soft. Bowel sounds are normal. She exhibits no distension. There is no tenderness. There is no rebound and no guarding.   Musculoskeletal: Normal range of motion. She exhibits no edema or deformity.   Lymphadenopathy:     She has no cervical adenopathy.   Neurological: She is alert and oriented to person, place, and time. She has normal reflexes.   Skin: Skin is warm and dry. No rash noted.   Psychiatric: She has a normal mood and affect. Her behavior is normal. Judgment and thought content normal.   Nursing note and vitals reviewed.      Significant Labs: All pertinent labs within the past 24 hours have been reviewed.    Significant Imaging: I have reviewed all pertinent imaging results/findings within the past 24 hours.

## 2018-07-16 NOTE — PROGRESS NOTES
Reviewed blood glucose measures with hospital medicine.    Sliding scale modified to moderate scale. Continue to monitor patient for tolerance.

## 2018-07-16 NOTE — PLAN OF CARE
Problem: Patient Care Overview  Goal: Plan of Care Review  Outcome: Ongoing (interventions implemented as appropriate)  Pt free of falls during shift. VSS. PIV intact and infusing abx as ordered. Sinus tach on tele monitor. Pt complains of pain and anxiety, both controlled with PRN meds. Accuchecks completed as ordered, SSI insulin given. Pt on room air, no SOB noted. Call light in reach, hourly rounding made, family at bedside, will continue to monitor.

## 2018-07-16 NOTE — PLAN OF CARE
Pt AAOx4. POC discussed w/patient, verbalized understanding. Patient became NSR on monitor after 10 pm 07/15/2018, was tachycardic upon admission. VSS and afebrile. Voids per BR[facility]. Patient turns independently in bed. Fall precautions in place, bed alarm REFUSED but observed to ambulate with even, steady gait, bed in lowest, call light and personal items within reach.     SKIN: Patient has generalized bruising to BUE.

## 2018-07-16 NOTE — PLAN OF CARE
Problem: Patient Care Overview  Goal: Plan of Care Review  Outcome: Ongoing (interventions implemented as appropriate)  Patient is on room air and is tolerating well. No distress noted.

## 2018-07-16 NOTE — PROGRESS NOTES
[facility] Hospital Medicine  Progress Note    Patient Name: Ladi Gabriel  MRN: [id number]  Patient Class: IP- Inpatient   Admission Date: 7/12/2018  Length of Stay: 4 days  Attending Physician: Tyshawn Noel, *  Primary Care Provider: Provider [facility]        Subjective:     Principal Problem:Pyelonephritis    HPI:  Started feeling bad this Tuesday.  The first symptom she noted was her hands felt warm early in the day.  Later on she had fevers and chills and mid back pain.  Associated with weakness, dizziness, and shortness of breath walking around the house.  Later she developed nausea and vomiting.  She vomited a clear yellow liquid that tasted bitter.  Yesterday she went to an after-hours clinic and was given Tylenol and Ibuprofen for symptoms and sent home.  She continued to feel worse so came to the ER today.  Denies any recent blood loss and her menstrual cycles have been regular with normal volume. No other recent illness or known sick contacts.    Hospital Course:  Admitted for evaluation and treatment of symptomatic blood loss anemia and sepsis from right pyelonephritis.  Urinalysis with a pattern of inflammatory factor consistent with urinary tract infection.  Blood and urine cultures drawn.  Empirically started Ceftriaxone.  IV fluid resuscitation with 2 L normal saline followed by 1 L Lactated Ringers.  Ordered complete abdominal ultrasound to evaluate kidneys and gallbladder which showed bilateral pyelonephritis with enlargement or the right kidney.  Liver, gallbladder, and spleen normal.  Changed antibiotic back to Ceftriaxone.  Blood cultures grew Gram-negative rods.    7/14/18 Pt was seen and examined at bedside .  She  Was placed on o2 by nc overnight due to hypoxia . The cxr show pulmonary congestion .  She cont  Speaking fever .   7/15/18. CT abdomen show pylo  And lower lung consolidation .  She has 2 prbc  W/o any problem.   7/16/18 Pt states feel better .  She cont speaking fever    Interval History: pt was seen and examined at bedside . She states feeling better .     Review of Systems   Constitutional: Positive for fever. Negative for chills.   HENT: Negative for congestion and sore throat.    Eyes: Negative for visual disturbance.   Respiratory: Negative for cough, shortness of breath and wheezing.    Cardiovascular: Negative for chest pain, palpitations and leg swelling.   Gastrointestinal: Negative for abdominal pain, blood in stool, constipation, diarrhea, nausea and vomiting.   Genitourinary: Negative for dysuria and hematuria.   Musculoskeletal: Negative for arthralgias and back pain.   Skin: Negative for rash and wound.   Neurological: Negative for dizziness, weakness, light-headedness and numbness.   Hematological: Negative for adenopathy.     Objective:     Vital Signs (Most Recent):  Temp: 100.1 °F (37.8 °C) (07/16/18 1130)  Pulse: (!) 113 (07/16/18 1332)  Resp: 18 (07/16/18 1130)  BP: (!) 143/87 (07/16/18 1130)  SpO2: 96 % (07/16/18 1130) Vital Signs (24h Range):  Temp:  [97.8 °F (36.6 °C)-103.2 °F (39.6 °C)] 100.1 °F (37.8 °C)  Pulse:  [] 113  Resp:  [18-20] 18  SpO2:  [91 %-100 %] 96 %  BP: (107-144)/() 143/87     Weight: 114.4 kg (252 lb 3.3 oz)  Body mass index is 44.68 kg/m².    Intake/Output Summary (Last 24 hours) at 07/16/18 1417  Last data filed at 07/16/18 0800   Gross per 24 hour   Intake              680 ml   Output              600 ml   Net               80 ml      Physical Exam   Constitutional: She is oriented to person, place, and time. She appears well-developed and well-nourished. No distress.   HENT:   Head: Normocephalic and atraumatic.   Mouth/Throat: Oropharynx is clear and moist.   Eyes: Conjunctivae and EOM are normal. Pupils are equal, round, and reactive to light.   Neck: Neck supple. No JVD present. No thyromegaly present.   Cardiovascular: Regular rhythm.  Exam reveals no gallop and no friction rub.    No murmur  "heard.  Pulmonary/Chest: Effort normal and breath sounds normal. She has no wheezes. She has no rales.   Abdominal: Soft. Bowel sounds are normal. She exhibits no distension. There is no tenderness. There is no rebound and no guarding.   Musculoskeletal: Normal range of motion. She exhibits no edema or deformity.   Lymphadenopathy:     She has no cervical adenopathy.   Neurological: She is alert and oriented to person, place, and time. She has normal reflexes.   Skin: Skin is warm and dry. No rash noted.   Psychiatric: She has a normal mood and affect. Her behavior is normal. Judgment and thought content normal.   Nursing note and vitals reviewed.      Significant Labs: All pertinent labs within the past 24 hours have been reviewed.    Significant Imaging: I have reviewed all pertinent imaging results/findings within the past 24 hours.    Assessment/Plan:      * Pyelonephritis    Change IVAB to zosyn   CT abdomen  Show pylo   Blood cx (+) E.coli pan sensitive           Bacteremia due to Gram-negative bacteria    Change IVAB to zosyn   Order Status: Completed Specimen: Blood from Peripheral, Antecubital, Right Updated: 07/15/18 0720    Blood Culture, Routine Gram stain adrian bottle: Gram negative rods     Blood Culture, Routine Results called to and read back by: 07/13/2018  09:15    Blood Culture, Routine ESCHERICHIA COLI   Susceptibility      Escherichia coli     CULTURE, BLOOD     Amox/K Clav'ate <=8/4 "><=8/4  Sensitive     Amp/Sulbactam <=4/2 "><=4/2  Sensitive     Ampicillin 4  Sensitive     Cefazolin <=2 "><=2  Sensitive     Ceftriaxone <=1 "><=1  Sensitive     Ciprofloxacin <=0.5 "><=0.5  Sensitive     Ertapenem <=0.5 "><=0.5  Sensitive     Gentamicin <=1 "><=1  Sensitive     Meropenem <=1 "><=1  Sensitive     Piperacillin/Tazo <=8 "><=8  Sensitive     Tetracycline <=2 "><=2  Sensitive     Tobramycin <=2 "><=2  Sensitive     Trimeth/Sulfa <=0.5/9.5 "><=0.5/9.5  Sensitive                    Major depressive " disorder    With anxiety component.  Continue Venlafaxine and Alprazolam at reduced dose.        Type 2 diabetes mellitus with hyperglycemia, without long-term current use of insulin    Accuchecks and low dose sliding scale.  Levemir 20 units nightly.  Diabetic diet.  Check hemoglobin A1c.        Acute blood loss anemia    S/P 4 prbc   H/h cont> 8  H/O menorrhagia           Severe sepsis    2/2 to pyelonephritis   Cont IVAB           Non-intractable vomiting with nausea    Improving \  Most likely 2/2 to  Sepsis and pylo          VTE Risk Mitigation         Ordered     Place sequential compression device  Until discontinued      07/12/18 1824     Reason for No Pharmacological VTE Prophylaxis  Once      07/12/18 1824     IP VTE HIGH RISK PATIENT  Once      07/12/18 1824          Plan:   Cont current tx  ID consulted     Tyshawn Noel MD  Department of Hospital Medicine   [facility]

## 2018-07-17 VITALS
HEIGHT: 63 IN | WEIGHT: 252.19 LBS | DIASTOLIC BLOOD PRESSURE: 71 MMHG | HEART RATE: 94 BPM | OXYGEN SATURATION: 94 % | SYSTOLIC BLOOD PRESSURE: 130 MMHG | TEMPERATURE: 99 F | BODY MASS INDEX: 44.68 KG/M2 | RESPIRATION RATE: 17 BRPM

## 2018-07-17 PROBLEM — R65.20 SEVERE SEPSIS: Status: RESOLVED | Noted: 2018-07-12 | Resolved: 2018-07-17

## 2018-07-17 PROBLEM — A41.9 SEVERE SEPSIS: Status: RESOLVED | Noted: 2018-07-12 | Resolved: 2018-07-17

## 2018-07-17 PROBLEM — R11.2 NON-INTRACTABLE VOMITING WITH NAUSEA: Status: RESOLVED | Noted: 2018-07-12 | Resolved: 2018-07-17

## 2018-07-17 PROBLEM — D62 ACUTE BLOOD LOSS ANEMIA: Status: RESOLVED | Noted: 2018-07-12 | Resolved: 2018-07-17

## 2018-07-17 LAB
ANION GAP SERPL CALC-SCNC: 10 MMOL/L
BACTERIA BLD CULT: NORMAL
BASOPHILS # BLD AUTO: 0.02 K/UL
BASOPHILS NFR BLD: 0.2 %
BUN SERPL-MCNC: 10 MG/DL
CALCIUM SERPL-MCNC: 8.8 MG/DL
CHLORIDE SERPL-SCNC: 102 MMOL/L
CO2 SERPL-SCNC: 23 MMOL/L
CREAT SERPL-MCNC: 1 MG/DL
DIFFERENTIAL METHOD: ABNORMAL
EOSINOPHIL # BLD AUTO: 0.2 K/UL
EOSINOPHIL NFR BLD: 2.4 %
ERYTHROCYTE [DISTWIDTH] IN BLOOD BY AUTOMATED COUNT: 23.8 %
EST. GFR  (AFRICAN AMERICAN): >60 ML/MIN/1.73 M^2
EST. GFR  (NON AFRICAN AMERICAN): >60 ML/MIN/1.73 M^2
GLUCOSE SERPL-MCNC: 304 MG/DL
HCT VFR BLD AUTO: 28.7 %
HGB BLD-MCNC: 8.9 G/DL
LYMPHOCYTES # BLD AUTO: 2.2 K/UL
LYMPHOCYTES NFR BLD: 23.6 %
MCH RBC QN AUTO: 20.9 PG
MCHC RBC AUTO-ENTMCNC: 31 G/DL
MCV RBC AUTO: 67 FL
MONOCYTES # BLD AUTO: 0.8 K/UL
MONOCYTES NFR BLD: 9.2 %
NEUTROPHILS # BLD AUTO: 5.9 K/UL
NEUTROPHILS NFR BLD: 67.6 %
PLATELET # BLD AUTO: 303 K/UL
PMV BLD AUTO: 9.3 FL
POCT GLUCOSE: 237 MG/DL (ref 70–110)
POCT GLUCOSE: 239 MG/DL (ref 70–110)
POCT GLUCOSE: 327 MG/DL (ref 70–110)
POTASSIUM SERPL-SCNC: 4 MMOL/L
RBC # BLD AUTO: 4.26 M/UL
SODIUM SERPL-SCNC: 135 MMOL/L
WBC # BLD AUTO: 9.14 K/UL

## 2018-07-17 PROCEDURE — 85025 COMPLETE CBC W/AUTO DIFF WBC: CPT

## 2018-07-17 PROCEDURE — 25000003 PHARM REV CODE 250: Performed by: EMERGENCY MEDICINE

## 2018-07-17 PROCEDURE — 25000003 PHARM REV CODE 250: Performed by: NURSE PRACTITIONER

## 2018-07-17 PROCEDURE — 94760 N-INVAS EAR/PLS OXIMETRY 1: CPT

## 2018-07-17 PROCEDURE — 80048 BASIC METABOLIC PNL TOTAL CA: CPT

## 2018-07-17 PROCEDURE — 94799 UNLISTED PULMONARY SVC/PX: CPT

## 2018-07-17 PROCEDURE — 99900035 HC TECH TIME PER 15 MIN (STAT)

## 2018-07-17 PROCEDURE — 63600175 PHARM REV CODE 636 W HCPCS: Performed by: INTERNAL MEDICINE

## 2018-07-17 PROCEDURE — 25000003 PHARM REV CODE 250: Performed by: INTERNAL MEDICINE

## 2018-07-17 PROCEDURE — 36415 COLL VENOUS BLD VENIPUNCTURE: CPT

## 2018-07-17 RX ORDER — CIPROFLOXACIN 500 MG/1
500 TABLET ORAL EVERY 12 HOURS
Qty: 20 TABLET | Refills: 0 | Status: SHIPPED | OUTPATIENT
Start: 2018-07-17 | End: 2018-07-27

## 2018-07-17 RX ADMIN — INSULIN ASPART 8 UNITS: 100 INJECTION, SOLUTION INTRAVENOUS; SUBCUTANEOUS at 11:07

## 2018-07-17 RX ADMIN — INSULIN ASPART 4 UNITS: 100 INJECTION, SOLUTION INTRAVENOUS; SUBCUTANEOUS at 07:07

## 2018-07-17 RX ADMIN — HYDROCODONE BITARTRATE AND ACETAMINOPHEN 1 TABLET: 5; 325 TABLET ORAL at 12:07

## 2018-07-17 RX ADMIN — VENLAFAXINE HYDROCHLORIDE 75 MG: 75 CAPSULE, EXTENDED RELEASE ORAL at 08:07

## 2018-07-17 RX ADMIN — PIPERACILLIN AND TAZOBACTAM 4.5 G: 4; .5 INJECTION, POWDER, LYOPHILIZED, FOR SOLUTION INTRAVENOUS; PARENTERAL at 06:07

## 2018-07-17 NOTE — DISCHARGE SUMMARY
[facility] Hospital Medicine  Discharge Summary      Patient Name: Ladi Gabriel  MRN: [id number]  Admission Date: 7/12/2018  Hospital Length of Stay: 5 days  Discharge Date and Time:  07/17/2018 12:10 PM  Attending Physician: Tyshawn Noel, *   Discharging Provider: Tyshawn Noel MD  Primary Care Provider: Provider Not[facility]ystem      HPI:   Started feeling bad this Tuesday.  The first symptom she noted was her hands felt warm early in the day.  Later on she had fevers and chills and mid back pain.  Associated with weakness, dizziness, and shortness of breath walking around the house.  Later she developed nausea and vomiting.  She vomited a clear yellow liquid that tasted bitter.  Yesterday she went to an after-hours clinic and was given Tylenol and Ibuprofen for symptoms and sent home.  She continued to feel worse so came to the ER today.  Denies any recent blood loss and her menstrual cycles have been regular with normal volume. No other recent illness or known sick contacts.    * No surgery found *      Hospital Course:   Admitted for evaluation and treatment of symptomatic blood loss anemia and sepsis from right pyelonephritis.  Urinalysis with a pattern of inflammatory factor consistent with urinary tract infection.  Blood and urine cultures drawn.  Empirically started Ceftriaxone.  IV fluid resuscitation with 2 L normal saline followed by 1 L Lactated Ringers.  Ordered complete abdominal ultrasound to evaluate kidneys and gallbladder which showed bilateral pyelonephritis with enlargement or the right kidney.  Liver, gallbladder, and spleen normal.  Changed antibiotic back to Ceftriaxone.  Blood cultures grew Gram-negative rods.    7/14/18 Pt was seen and examined at bedside .  She  Was placed on o2 by nc overnight due to hypoxia . The cxr show pulmonary congestion .  She cont  Speaking fever .   7/15/18. CT abdomen show pylo  And lower lung consolidation .  She has 2  "prbc  W/o any problem.   7/16/18 Pt states feel better . She cont speaking fever  7/17/19 Pt has been afebrile for more than 32 hrs  Pt was seen and examined at bedside . She was determined to be suitable for d/c        Consults:   Consults         Status Ordering Provider     Inpatient consult to Infectious Diseases  Once     Provider:  Maulik Adam MD    Acknowledged VIDAL MENDEZ          * Pyelonephritis    S/pv IVAB to zosyn   CT abdomen  Show pylo   Blood cx (+) E.coli pan sensitive   D/c on cipro           Bacteremia due to Gram-negative bacteria    Change IVAB to zosyn   Order Status: Completed Specimen: Blood from Peripheral, Antecubital, Right Updated: 07/15/18 0720    Blood Culture, Routine Gram stain adrian bottle: Gram negative rods     Blood Culture, Routine Results called to and read back by: 07/13/2018  09:15    Blood Culture, Routine ESCHERICHIA COLI   Susceptibility      Escherichia coli     CULTURE, BLOOD     Amox/K Clav'ate <=8/4 "><=8/4  Sensitive     Amp/Sulbactam <=4/2 "><=4/2  Sensitive     Ampicillin 4  Sensitive     Cefazolin <=2 "><=2  Sensitive     Ceftriaxone <=1 "><=1  Sensitive     Ciprofloxacin <=0.5 "><=0.5  Sensitive     Ertapenem <=0.5 "><=0.5  Sensitive     Gentamicin <=1 "><=1  Sensitive     Meropenem <=1 "><=1  Sensitive     Piperacillin/Tazo <=8 "><=8  Sensitive     Tetracycline <=2 "><=2  Sensitive     Tobramycin <=2 "><=2  Sensitive     Trimeth/Sulfa <=0.5/9.5 "><=0.5/9.5  Sensitive                    Major depressive disorder    With anxiety component.  Continue Venlafaxine and Alprazolam at reduced dose.        Type 2 diabetes mellitus with hyperglycemia, without long-term current use of insulin     Resume metformin           Final Active Diagnoses:    Diagnosis Date Noted POA    PRINCIPAL PROBLEM:  Pyelonephritis [N12] 07/12/2018 Yes    Bacteremia due to Gram-negative bacteria [R78.81] 07/13/2018 Yes    Type 2 diabetes mellitus with hyperglycemia, without " long-term current use of insulin [E11.65] 07/12/2018 Yes    Major depressive disorder [F32.9] 07/12/2018 Yes      Problems Resolved During this Admission:    Diagnosis Date Noted Date Resolved POA    Non-intractable vomiting with nausea [R11.2] 07/12/2018 07/17/2018 Yes    Severe sepsis [A41.9, R65.20] 07/12/2018 07/17/2018 Yes    Acute blood loss anemia [D62] 07/12/2018 07/17/2018 Yes       Discharged Condition: good    Disposition: Home or Self Care    Follow Up:  Follow-up Information     Provider [facility] In 1 week.               Patient Instructions:     Diet diabetic     Notify your health care provider if you experience any of the following:  temperature >100.4     Notify your health care provider if you experience any of the following:  persistent nausea and vomiting or diarrhea     Notify your health care provider if you experience any of the following:  severe uncontrolled pain     Notify your health care provider if you experience any of the following:  redness, tenderness, or signs of infection (pain, swelling, redness, odor or green/yellow discharge around incision site)     Notify your health care provider if you experience any of the following:  difficulty breathing or increased cough     Notify your health care provider if you experience any of the following:  severe persistent headache     Notify your health care provider if you experience any of the following:  worsening rash     Notify your health care provider if you experience any of the following:  persistent dizziness, light-headedness, or visual disturbances     Notify your health care provider if you experience any of the following:     Notify your health care provider if you experience any of the following:  increased confusion or weakness     Activity as tolerated         Significant Diagnostic Studies: Labs:   BMP:   Recent Labs  Lab 07/16/18  0837 07/17/18  0517   * 304*    135*   K 4.1 4.0    102   CO2 25 23    BUN 11 10   CREATININE 1.0 1.0   CALCIUM 9.2 8.8   , CMP   Recent Labs  Lab 07/16/18  0837 07/17/18  0517    135*   K 4.1 4.0    102   CO2 25 23   * 304*   BUN 11 10   CREATININE 1.0 1.0   CALCIUM 9.2 8.8   ANIONGAP 11 10   ESTGFRAFRICA >60 >60   EGFRNONAA >60 >60   , CBC   Recent Labs  Lab 07/16/18  0837 07/17/18  0517   WBC 10.61 9.14   HGB 9.4* 8.9*   HCT 30.4* 28.7*    303   , INR   Lab Results   Component Value Date    INR 1.1 07/13/2018    INR 1.0 11/16/2012    INR 1.0 11/08/2012   , Troponin No results for input(s): TROPONINI in the last 168 hours., A1C:   Recent Labs  Lab 07/13/18  1901   HGBA1C 7.8*    and All labs within the past 24 hours have been reviewed  Microbiology:   Blood Culture   Lab Results   Component Value Date    LABBLOO Gram stain adrian bottle: Gram negative rods  07/12/2018    LABBLOO  07/12/2018     Results called to and read back by: Marci Francis RN 07/13/2018  08:26    LABBLOO Gram stain aer bottle: Gram negative rods  07/12/2018    LABBLOO  07/12/2018     Positive results previously called 07/15/2018  16:37    LABBLOO  07/12/2018     ESCHERICHIA COLI  For susceptibility see order #[id number]      and Urine Culture    Lab Results   Component Value Date    LABURIN ESCHERICHIA COLI  >100,000 cfu/ml   07/12/2018     Radiology: CT scan: CT ABDOMEN PELVIS WITH CONTRAST:   Results for orders placed or performed during the hospital encounter of 07/12/18   CT Abdomen Pelvis With Contrast    Narrative    EXAMINATION:  CT ABDOMEN PELVIS WITH CONTRAST    CLINICAL HISTORY:  Pyelonephritis, uncomplicated;    TECHNIQUE:  Axial CT images were obtained of the abdomen and pelvis following IV contrast administration and without oral contrast.  Sagittal and coronal reformats obtained.    COMPARISON:  None.    FINDINGS:  ABDOMEN:    - Lung bases: Small bilateral pleural effusions.  Large amount of patchy consolidation throughout both lower lobes.    - Liver: Liver appears mildly  enlarged.    - Gallbladder: No calcified gallstones.    - Bile Ducts: No evidence of intra or extra hepatic biliary ductal dilation.    - Spleen: The spleen appears normal.    - Kidneys: No hydronephrosis or calculi.  Minimal stranding around the right and left kidneys with some prominent urothelial enhancement more note with the left renal pelvis.  A 2 mm left renal calculus.  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  No obstructing calculus evident.    - Adrenals: Normal adrenals.    - Pancreas: Pancreas appears normal.    - Bowel: The bowel is nonobstructed and unremarkable.    - Retroperitoneum:  Unremarkable.    - Vascular: No abdominal aortic aneurysm.    - Abdominal wall:  Unremarkable.    PELVIS:    - Bladder: Normal.    - : Small calcified posterior uterine fibroid.  Adnexa unremarkable.    BONES:  No acute osseous abnormality and no significant arthritic changes.      Impression    Mild stranding around both kidneys with areas of urothelial enhancement, consistent with history of pyelonephritis/UTI.  Negative for abscess.  Negative for hydronephrosis.  Tiny left renal calculus.    Small bilateral pleural effusions are present with an extensive amount of consolidation throughout both the right and left lower lobes concerning for pneumonia.    Mild hepatomegaly.    All CT scans at this facility are performed  using dose modulation techniques as appropriate to performed exam including the following:  automated exposure control; adjustment of mA and/or kV according to the patients size (this includes techniques or standardized protocols for targeted exams where dose is matched to indication/reason for exam: i.e. extremities or head);  iterative reconstruction technique.      Electronically signed by: Maulik Mon MD  Date:    07/14/2018  Time:    14:51     Ultrasound:     Pending Diagnostic Studies:     None         Medications:  Reconciled Home Medications:       Medication List      START taking these medications    ciprofloxacin HCl 500 MG tablet  Commonly known as:  CIPRO  Take 1 tablet (500 mg total) by mouth every 12 (twelve) hours. for 10 days        CONTINUE taking these medications    EFFEXOR  MG Cp24  Generic drug:  venlafaxine  Take 75 mg by mouth once daily.     ferrous sulfate 324 mg (65 mg iron) Tbec  Take 325 mg by mouth once daily.     VITAMIN D2 50,000 unit Cap  Generic drug:  ergocalciferol  Take 50,000 Units by mouth every 7 days.     XANAX 1 MG tablet  Generic drug:  ALPRAZolam  Take 1 mg by mouth 2 (two) times daily.        STOP taking these medications    metFORMIN 1000 MG tablet  Commonly known as:  GLUCOPHAGE            Indwelling Lines/Drains at time of discharge:   Lines/Drains/Airways          No matching active lines, drains, or airways          Time spent on the discharge of patient:  35  minutes  Patient was seen and examined on the date of discharge and determined to be suitable for discharge.         Tyshawn Noel MD  Department of Hospital Medicine  [facility]

## 2018-07-17 NOTE — NURSING
Discharge instructions, medications, and appointments reviewed with patient. Pt verbalized understanding.

## 2018-07-17 NOTE — PLAN OF CARE
Problem: Patient Care Overview  Goal: Plan of Care Review  Outcome: Ongoing (interventions implemented as appropriate)  POC reviewed, including indications and possible side effects of administered medications. Patient verbalized understanding and teach back. No adverse reactions noted. Patient c/o back pain. Administered medications per order. NSR/ST on monitor. Monitoring CBG's. Patient remains free of injuries and falls during shift. Will continue to monitor.     24 hour chart check complete.

## 2018-07-18 NOTE — PHYSICIAN QUERY
PT Name: Ladi Gabriel  MR #: [id number]     Physician Query Form - Documentation Clarification      CDS: Vicky Mccullough RN     Contact information:  [email or web address]    Phone: [phone]    This form is a permanent document in the medical record.     Query Date: July 18, 2018    By submitting this query, we are merely seeking further clarification of documentation. Please utilize your independent clinical judgment when addressing the question(s) below.    The Medical record reflects the following:    Supporting Clinical Findings Location in Medical Record    K+ = 3.4 --> 3.0 --> 3.3    potassium chloride SA CR tablet 20 mEq  Dose: 20 mEq  Freq: 3 times daily Route: Oral  Start: 07/13/18 0900 End: 07/15/18 2059      Lab 7/12/18 --> 7/13/18 --> 7/14/18     MAR                                                                                      Doctor, Please specify diagnosis or diagnoses associated with above clinical findings.    Provider Use Only                  [ x ] Hypokalemia          [  ] Other _______________                                                                                                                     [  ] Clinically undetermined

## 2018-07-18 NOTE — PLAN OF CARE
07/18/18 0955   Final Note   Assessment Type Final Discharge Note   Discharge Disposition Home

## 2018-07-18 NOTE — PHYSICIAN QUERY
PT Name: Ladi Gabriel  MR #: [id number]     Physician Query Form - Documentation Clarification      CDS: Vicky Mccullough RN     Contact information:  [email or web address]    Phone: [phone]    This form is a permanent document in the medical record.     Query Date: July 18, 2018    By submitting this query, we are merely seeking further clarification of documentation. Please utilize your independent clinical judgment when addressing the question(s) below.    The Medical record reflects the following:    Supporting Clinical Findings Location in Medical Record     Severe sepsis  Tachycardia and fever with exam and abnormal urinalysis consistent with right pyelonephritis.  IV fluid resuscitation.  Normal lactic acid.  Empiric antibiotics given and cultures drawn.  Imaging of the abdomen ordered    Bacteremia due to Gram-negative bacteria     ESCHERICHIA COLI    ESCHERICHIA COLI >100,000 cfu/ml         H&P 7/12/18             DS 7/17/18     Blood Culture 7/12/18     Urine C&S 7/12/18                                                                                Doctor, Please specify diagnosis or diagnoses associated with above clinical findings.    Provider Use Only          [x  ]  Sepsis due to E-coli      [  ] Sepsis due to other organism (please specify) ______________      [  ] Other___________________                                                                                                             [  ] Clinically undetermined

## 2018-07-18 NOTE — PHYSICIAN QUERY
"PT Name: Ladi Gabriel  MR #: [id number]     Physician Query Form - Documentation Clarification      CDS: Vicky Mccullough RN     Contact information:  [email or web address]    Phone: [phone]    This form is a permanent document in the medical record.     Query Date: July 18, 2018    By submitting this query, we are merely seeking further clarification of documentation. Please utilize your independent clinical judgment when addressing the question(s) below.    The Medical record reflects the following:    Supporting Clinical Findings Location in Medical Record   Height= 5'3"(1.6 m)  Weight: 114.9 kg (253 lb 6.4 oz)   Body mass index is 43.5 kg/m².        Anthropometric flow sheet                                                                                       Doctor, Please specify diagnosis or diagnoses associated with above clinical findings.    Provider Use Only        [ x] Morbid Obesity      [  ] Other _____________________                                                                                                                 [  ] Clinically undetermined            "

## 2018-07-18 NOTE — PHYSICIAN QUERY
PT Name: Ladi Gabriel  MR #: [id number]     Physician Query Form - Documentation Clarification      CDS: Vicky Mccullough RN     Contact information:  [email or web address]    Phone: [phone]    This form is a permanent document in the medical record.     Query Date: July 18, 2018    By submitting this query, we are merely seeking further clarification of documentation. Please utilize your independent clinical judgment when addressing the question(s) below.    The Medical record reflects the following:    Supporting Clinical Findings Location in Medical Record      Started feeling bad this Tuesday    Later on she had fevers and chills and mid back pain    Pyelonephritis    - Kidneys: No hydronephrosis or calculi.  Minimal stranding around the right and left kidneys with some prominent urothelial enhancement more note with the left renal pelvis.  A 2 mm left renal calculus.  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  No obstructing calculus evident.      H&P 7/12/18     H&P 7/12/18     H&P 7/12/18     CT 7/14/18                                                                                      Doctor, please specify the acuity of the diagnosis associated with above clinical findings.    Provider Use Only          [ x ] Acute Pyelonephritis      [  ] Other ______________                                                                                                                 [  ] Clinically undetermined

## 2018-07-18 NOTE — PHYSICIAN QUERY
PT Name: Ladi Gabriel  MR #: [id number]     Physician Query Form - Documentation Clarification      CDS: Vicky Mccullough RN     Contact information:  [email or web address]    Phone: [phone]    This form is a permanent document in the medical record.     Query Date: July 18, 2018    By submitting this query, we are merely seeking further clarification of documentation. Please utilize your independent clinical judgment when addressing the question(s) below.    The Medical record reflects the following:    Supporting Clinical Findings Location in Medical Record    Mag= 1.2 --> 1.3    magnesium oxide tablet 400 mg  Dose: 400 mg  Freq: 2 times daily Route: Oral  Start: 07/12/18 2200 End: 07/15/18 0803    Lab 7/12/18 --> 7/13/18     MAR                                                                                      Doctor, Please specify diagnosis or diagnoses associated with above clinical findings.    Provider Use Only            [x  ] Hypomagnesemia           [  ] Other __________________                                                                                                                   [  ] Clinically undetermined

## 2024-04-15 NOTE — ED NOTES
Pt resting in bed comfortably. AAO x 4. Skin warm and dry. Temp noted. Dr Bowman notified  Breath sounds even and unlabored with equal chest rise and fall   PACU